# Patient Record
Sex: MALE | Race: WHITE | NOT HISPANIC OR LATINO | Employment: UNEMPLOYED | URBAN - METROPOLITAN AREA
[De-identification: names, ages, dates, MRNs, and addresses within clinical notes are randomized per-mention and may not be internally consistent; named-entity substitution may affect disease eponyms.]

---

## 2023-10-05 PROBLEM — E88.09 PROTEINS SERUM PLASMA LOW: Status: ACTIVE | Noted: 2023-10-05

## 2023-10-05 PROBLEM — E78.1 HYPERTRIGLYCERIDEMIA: Status: ACTIVE | Noted: 2023-10-05

## 2023-10-05 PROBLEM — E78.5 HYPERLIPIDEMIA: Status: ACTIVE | Noted: 2023-10-05

## 2023-10-05 PROBLEM — I10 HTN (HYPERTENSION), BENIGN: Status: ACTIVE | Noted: 2023-10-05

## 2023-10-05 PROBLEM — F33.2 SEVERE EPISODE OF RECURRENT MAJOR DEPRESSIVE DISORDER, WITHOUT PSYCHOTIC FEATURES (MULTI): Status: ACTIVE | Noted: 2023-10-05

## 2023-10-05 PROBLEM — G47.33 OBSTRUCTIVE SLEEP APNEA, ADULT: Status: ACTIVE | Noted: 2023-10-05

## 2023-10-05 PROBLEM — G25.81 RESTLESS LEG SYNDROME: Status: ACTIVE | Noted: 2023-10-05

## 2023-10-05 PROBLEM — M17.12 ARTHRITIS OF KNEE, LEFT: Status: ACTIVE | Noted: 2023-10-05

## 2023-10-05 PROBLEM — G47.9 DIFFICULTY SLEEPING: Status: ACTIVE | Noted: 2023-10-05

## 2023-10-05 PROBLEM — E66.01 MORBID OBESITY (MULTI): Status: ACTIVE | Noted: 2023-10-05

## 2023-10-05 PROBLEM — E11.9 CONTROLLED TYPE 2 DIABETES MELLITUS WITHOUT COMPLICATION, WITHOUT LONG-TERM CURRENT USE OF INSULIN (MULTI): Status: ACTIVE | Noted: 2023-10-05

## 2023-10-05 PROBLEM — M23.307 DEGENERATIVE TEAR OF MENISCUS OF LEFT KNEE: Status: ACTIVE | Noted: 2023-10-05

## 2023-10-05 PROBLEM — R79.89 LOW VITAMIN D LEVEL: Status: ACTIVE | Noted: 2023-10-05

## 2023-10-05 RX ORDER — CHOLECALCIFEROL (VITAMIN D3) 125 MCG
1 CAPSULE ORAL DAILY
COMMUNITY
Start: 2019-09-11

## 2023-10-05 RX ORDER — BLOOD SUGAR DIAGNOSTIC
STRIP MISCELLANEOUS
COMMUNITY
Start: 2021-03-01

## 2023-10-05 RX ORDER — TIZANIDINE 2 MG/1
1-2 TABLET ORAL 3 TIMES DAILY PRN
COMMUNITY
Start: 2021-09-20

## 2023-10-05 RX ORDER — HYDROCHLOROTHIAZIDE 25 MG/1
1 TABLET ORAL DAILY
COMMUNITY
Start: 2021-11-19

## 2023-10-05 RX ORDER — TRAMADOL HYDROCHLORIDE 50 MG/1
1 TABLET ORAL 2 TIMES DAILY PRN
COMMUNITY
Start: 2021-06-09

## 2023-10-05 RX ORDER — SELENIUM SULFIDE 2.5 MG/100ML
1 LOTION TOPICAL
COMMUNITY
Start: 2016-06-01

## 2023-10-05 RX ORDER — GABAPENTIN 300 MG/1
1 CAPSULE ORAL
COMMUNITY
Start: 2022-10-27

## 2023-10-05 RX ORDER — LIRAGLUTIDE 6 MG/ML
INJECTION SUBCUTANEOUS
COMMUNITY
Start: 2022-02-02

## 2023-10-05 RX ORDER — ASPIRIN 81 MG/1
1 TABLET ORAL DAILY
COMMUNITY
Start: 2019-11-08

## 2023-10-05 RX ORDER — METFORMIN HYDROCHLORIDE 500 MG/1
1 TABLET ORAL
COMMUNITY
Start: 2019-09-11

## 2023-10-05 RX ORDER — LISINOPRIL 30 MG/1
1 TABLET ORAL DAILY
COMMUNITY
Start: 2020-09-08

## 2023-10-05 RX ORDER — NALOXONE HYDROCHLORIDE 4 MG/.1ML
SPRAY NASAL
COMMUNITY
Start: 2022-10-27

## 2023-10-05 RX ORDER — ATORVASTATIN CALCIUM 40 MG/1
1 TABLET, FILM COATED ORAL NIGHTLY
COMMUNITY
Start: 2019-10-07

## 2023-10-05 RX ORDER — DICLOFENAC SODIUM 75 MG/1
1 TABLET, DELAYED RELEASE ORAL 2 TIMES DAILY PRN
COMMUNITY
Start: 2021-06-09

## 2023-10-13 ENCOUNTER — TELEMEDICINE CLINICAL SUPPORT (OUTPATIENT)
Dept: UROLOGY | Facility: CLINIC | Age: 58
End: 2023-10-13
Payer: COMMERCIAL

## 2023-10-13 DIAGNOSIS — F33.0 MILD EPISODE OF RECURRENT MAJOR DEPRESSIVE DISORDER (CMS-HCC): Primary | ICD-10-CM

## 2023-10-13 PROCEDURE — 90837 PSYTX W PT 60 MINUTES: CPT | Performed by: PSYCHOLOGIST

## 2023-10-18 NOTE — PROGRESS NOTES
"  Start Time: 1:00  End Time: 1:57    Session format: Telehealth video visit  Session location: Outpatient clinic    Focus of treatment:   Problem List Items Addressed This Visit    None      Session Content  We discussed the following elements from the last session and the homework: Processed ongoing application of homework throughout the week.     The specific focus and goals for this session were on mood management and self acceptance.     We addressed the following therapy components during this session: Chon reports that things are \"ok\" overall.  He expressed that he has become more aware of the need to realize that he is \"not a machine\", such that making changes to his system may not respond mechanically.  Processed through this awareness and examined how making choices towards values and healthy living were often beneficial, allowing the capacity for compassion also needs to be present.  He states, \"I need to keep recognizing that I am a being to be loved.\" Examined self love/self compassion and how to look for elements that build confidence in his ability to navigate difficulties, and the capacity to engage choice, consequence, choice without judging his capacity based on an unforseen or uncontrollable consequence.     Next Appointment: 10/20/2023  "

## 2023-10-20 ENCOUNTER — TELEMEDICINE CLINICAL SUPPORT (OUTPATIENT)
Dept: UROLOGY | Facility: CLINIC | Age: 58
End: 2023-10-20
Payer: COMMERCIAL

## 2023-10-20 DIAGNOSIS — F33.0 MILD EPISODE OF RECURRENT MAJOR DEPRESSIVE DISORDER (CMS-HCC): Primary | ICD-10-CM

## 2023-10-20 PROCEDURE — 90837 PSYTX W PT 60 MINUTES: CPT | Performed by: PSYCHOLOGIST

## 2023-10-23 PROBLEM — F33.0 MILD EPISODE OF RECURRENT MAJOR DEPRESSIVE DISORDER (CMS-HCC): Status: ACTIVE | Noted: 2023-10-23

## 2023-10-25 NOTE — PROGRESS NOTES
"  Start Time: 1:02  End Time: 1:59    Session format: Telehealth video visit  Session location: Outpatient clinic    Focus of treatment:   Problem List Items Addressed This Visit       Mild episode of recurrent major depressive disorder (CMS/HCC) - Primary       Session Content    The specific focus and goals for this session were mood management.     We addressed the following therapy components during this session: Chon reports that sleep has been poor lately, and that he has been having intense headaches.  He acknowledged that when he experiences these kinds of pain experience he notices that it grabs his focus and become problematic. Explored ways to notice this tendency and engage values oriented behavior to ensure he does not get stuck in unhelpful avoidance.  Examined homework of \"messing up\" for growth, and he acknowledge realizing some cooking mistakes and how he was able to meet those with acceptance.  Further processed through avoidance strategies and Chon stated he wanted to get better at \"leaning into quiet\" rather than trying to avoid through \"noise\".  Explored difference between observation of \"presence of pain\" versus \"centralizing the pain\", and how this can change the amount of disruption it may have.      Next Appointment: 10/27/2023  "

## 2023-10-27 ENCOUNTER — TELEMEDICINE CLINICAL SUPPORT (OUTPATIENT)
Dept: UROLOGY | Facility: CLINIC | Age: 58
End: 2023-10-27
Payer: COMMERCIAL

## 2023-10-27 DIAGNOSIS — F33.0 MILD EPISODE OF RECURRENT MAJOR DEPRESSIVE DISORDER (CMS-HCC): Primary | ICD-10-CM

## 2023-10-27 PROCEDURE — 90837 PSYTX W PT 60 MINUTES: CPT | Performed by: PSYCHOLOGIST

## 2023-11-03 ENCOUNTER — TELEMEDICINE CLINICAL SUPPORT (OUTPATIENT)
Dept: UROLOGY | Facility: CLINIC | Age: 58
End: 2023-11-03
Payer: COMMERCIAL

## 2023-11-03 DIAGNOSIS — F33.0 MILD EPISODE OF RECURRENT MAJOR DEPRESSIVE DISORDER (CMS-HCC): Primary | ICD-10-CM

## 2023-11-03 PROCEDURE — 90837 PSYTX W PT 60 MINUTES: CPT | Performed by: PSYCHOLOGIST

## 2023-11-03 NOTE — PROGRESS NOTES
Start Time: 11:02  End Time: 12:00    Session format: Telehealth video visit  Session location: Outpatient clinic    Focus of treatment:   Problem List Items Addressed This Visit       Mild episode of recurrent major depressive disorder (CMS/HCC) - Primary       Session Content    The specific focus and goals for this session were focused on mood management.     We addressed the following therapy components during this session: Chon reported that he has been feeling isolated socially again.  Processed through current challenge of balancing physical health needs with mental health and social needs.  Explored social biases and Chon's assertion that he often finds himself feeling socially distanced from others due to perceived gap in intelligence and without feeling like there is contact with others he becomes more binary in his thinking.  Additionally he expressed that he often does not feel the freedom to try out thoughts with others for fear of social appraisal or the fear of being called out for being wrong.  Explored this anxiety and how it keeps him from feeling connected with others.     Next Appointment: 11/3/2023

## 2023-11-10 ENCOUNTER — TELEMEDICINE CLINICAL SUPPORT (OUTPATIENT)
Dept: UROLOGY | Facility: CLINIC | Age: 58
End: 2023-11-10
Payer: COMMERCIAL

## 2023-11-10 DIAGNOSIS — F33.0 MILD EPISODE OF RECURRENT MAJOR DEPRESSIVE DISORDER (CMS-HCC): Primary | ICD-10-CM

## 2023-11-10 PROCEDURE — 90837 PSYTX W PT 60 MINUTES: CPT | Performed by: PSYCHOLOGIST

## 2023-11-19 NOTE — PROGRESS NOTES
"  Start Time: 1:03  End Time: 2:00    Session format: Telehealth video visit  Session location: Outpatient clinic    Focus of treatment:   Problem List Items Addressed This Visit       Mild episode of recurrent major depressive disorder (CMS/HCC) - Primary       Session Content  The specific focus and goals for this session were towards mood management and engagement of valued interpersonal relationships.     We addressed the following therapy components during this session: Chon reported at the onset of the appointment that he feels like he is \"idling, but hasn't put it into gear\".  Utilized this metaphor to explore readiness for action, and inhibitors to action.  Explored a memory he had regarding a past interaction and his utilization of defusion skills to watch as an observer.  Examined interpersonal vs. Intrapersonal, esoteric vs. exoteric and how this balance of awareness might help him to engage relationships differently and to look at personal responsibility in interactions versus \"fault finding\".        Next Appointment: 11/10/2023  "

## 2023-11-24 ENCOUNTER — APPOINTMENT (OUTPATIENT)
Dept: UROLOGY | Facility: CLINIC | Age: 58
End: 2023-11-24
Payer: COMMERCIAL

## 2023-11-27 ENCOUNTER — TELEMEDICINE CLINICAL SUPPORT (OUTPATIENT)
Dept: UROLOGY | Facility: CLINIC | Age: 58
End: 2023-11-27
Payer: COMMERCIAL

## 2023-11-27 DIAGNOSIS — F33.0 MILD EPISODE OF RECURRENT MAJOR DEPRESSIVE DISORDER (CMS-HCC): Primary | ICD-10-CM

## 2023-11-27 PROCEDURE — 90837 PSYTX W PT 60 MINUTES: CPT | Performed by: PSYCHOLOGIST

## 2023-11-28 NOTE — PROGRESS NOTES
"  Start Time: 1:00  End Time: 1:59    Session format: Telehealth video visit  Session location: Outpatient clinic    Focus of treatment:   Problem List Items Addressed This Visit       Mild episode of recurrent major depressive disorder (CMS/HCC) - Primary       Session Content      The specific focus and goals for this session were mood management and navigation of interpersonal relationships.     We addressed the following therapy components during this session: Chon began by discussing what he has been contemplating since the previous session.  In specific, processed through the idea of social expectancy and how he finds himself being pulled by this in his relationships.  Explored the self as context/self as observer dynamic and his new insights into his self and \"michaelness\" is a dynamic experience.  Examined how the process of interpersonal interactions and knowing of others, can help lead to knowledge of self better.  Identified continued areas of focus over the next weeks.        Next Appointment: 11/27/2023  "

## 2023-12-01 ENCOUNTER — TELEMEDICINE CLINICAL SUPPORT (OUTPATIENT)
Dept: UROLOGY | Facility: CLINIC | Age: 58
End: 2023-12-01
Payer: COMMERCIAL

## 2023-12-01 DIAGNOSIS — F33.0 MILD EPISODE OF RECURRENT MAJOR DEPRESSIVE DISORDER (CMS-HCC): Primary | ICD-10-CM

## 2023-12-01 PROCEDURE — 90837 PSYTX W PT 60 MINUTES: CPT | Performed by: PSYCHOLOGIST

## 2023-12-08 ENCOUNTER — TELEMEDICINE CLINICAL SUPPORT (OUTPATIENT)
Dept: UROLOGY | Facility: CLINIC | Age: 58
End: 2023-12-08
Payer: COMMERCIAL

## 2023-12-08 DIAGNOSIS — F33.0 MILD EPISODE OF RECURRENT MAJOR DEPRESSIVE DISORDER (CMS-HCC): Primary | ICD-10-CM

## 2023-12-08 PROCEDURE — 90837 PSYTX W PT 60 MINUTES: CPT | Performed by: PSYCHOLOGIST

## 2023-12-12 NOTE — PROGRESS NOTES
"  Start Time: 1:00  End Time: 1:58    Session format: Telehealth video visit  Session location: Outpatient clinic    Focus of treatment:   Problem List Items Addressed This Visit       Mild episode of recurrent major depressive disorder (CMS/HCC) - Primary       Session Content      The specific focus and goals for this session were mood management.     We addressed the following therapy components during this session: Chon reports that he has been focusing on his re-engagement of spirituality and termed himself a \"lapsed agnostic\".  Examined how spiritual engagement has assisted in how Chon understands who he is, and how spiritual/Latter-day practice has changed in this current version.  Explored the tie from meditation practice to \"life engagement\" and the core sense of Chon as \"I am\".  Explored the impact this insight has had on his sense of emotional range and a protective factor to depressive periods.       Next Appointment: 12/1/2023  "

## 2023-12-15 ENCOUNTER — TELEMEDICINE CLINICAL SUPPORT (OUTPATIENT)
Dept: UROLOGY | Facility: CLINIC | Age: 58
End: 2023-12-15
Payer: COMMERCIAL

## 2023-12-15 DIAGNOSIS — F33.0 MILD EPISODE OF RECURRENT MAJOR DEPRESSIVE DISORDER (CMS-HCC): Primary | ICD-10-CM

## 2023-12-15 PROCEDURE — 90837 PSYTX W PT 60 MINUTES: CPT | Performed by: PSYCHOLOGIST

## 2023-12-15 NOTE — PROGRESS NOTES
"  Start Time: 1:00  End Time: 1:58    Session format: Telehealth video visit  Session location: Outpatient clinic    Focus of treatment:   Problem List Items Addressed This Visit       Mild episode of recurrent major depressive disorder (CMS/HCC) - Primary       Session Content      The specific focus and goals for this session were mood management and interpersonal relationship functioning.     We addressed the following therapy components during this session: Chon reports continuing to feel that he has made progress.  He states that he is beginning to navigate touching on \"profound sadness\" in his meditation.  Continued to help to frame feeling sadness as human experience in ways that help to prevent depression relapse.  Chon identified feelings of gratitude for the \"gift of time\" and reframing depression as having given him a way to view life from a different perspective and a recognition of his own humanity.  Explored how he has often expected himself to be \"machine like\" rather than human, and how that contributed to black and white binary thinking.  Identified ways to continue to explore this humanity and depth of experience.      Next Appointment: 12/8/2023  "

## 2023-12-22 ENCOUNTER — APPOINTMENT (OUTPATIENT)
Dept: UROLOGY | Facility: CLINIC | Age: 58
End: 2023-12-22
Payer: COMMERCIAL

## 2023-12-29 ENCOUNTER — APPOINTMENT (OUTPATIENT)
Dept: UROLOGY | Facility: CLINIC | Age: 58
End: 2023-12-29
Payer: COMMERCIAL

## 2024-01-05 ENCOUNTER — TELEMEDICINE CLINICAL SUPPORT (OUTPATIENT)
Dept: UROLOGY | Facility: CLINIC | Age: 59
End: 2024-01-05
Payer: COMMERCIAL

## 2024-01-05 DIAGNOSIS — F33.0 MILD EPISODE OF RECURRENT MAJOR DEPRESSIVE DISORDER (CMS-HCC): Primary | ICD-10-CM

## 2024-01-05 PROCEDURE — 90837 PSYTX W PT 60 MINUTES: CPT | Performed by: PSYCHOLOGIST

## 2024-01-11 NOTE — PROGRESS NOTES
"  Start Time: 1:00  End Time: 1:58    Session format: Telehealth video visit  Session location: Outpatient clinic    Focus of treatment:   Problem List Items Addressed This Visit       Mild episode of recurrent major depressive disorder (CMS/HCC) - Primary       Session Content    The specific focus and goals for this session were mood management and relationship functioning.     We addressed the following therapy components during this session: Chon began the session identifying a difficulty that occurred with a \"corporate entity\", and identified ways that he felt successful in his interactions by \"riding out the biochemical\" and allowing himself to stay in control of his responses such that he could investigate fused thoughts and respond with what he could do.  Explored the dichotomy of \"being vs. Doing\", framing this within an understanding of \"false dichotomy\".  Explored a rise in anxiety Chon has been experiencing and contextualized this with observations of other factors such as food and blood sugar.  Investigated maintaining factors that have been keeping him from engaging in social activities and explored possible ways to make behavioral shifts in this area towards his value of interpersonal connection.       Next Appointment: 12/15/2023  "

## 2024-01-12 ENCOUNTER — TELEMEDICINE CLINICAL SUPPORT (OUTPATIENT)
Dept: UROLOGY | Facility: CLINIC | Age: 59
End: 2024-01-12
Payer: COMMERCIAL

## 2024-01-12 DIAGNOSIS — F33.0 MILD EPISODE OF RECURRENT MAJOR DEPRESSIVE DISORDER (CMS-HCC): Primary | ICD-10-CM

## 2024-01-12 PROCEDURE — 90837 PSYTX W PT 60 MINUTES: CPT | Performed by: PSYCHOLOGIST

## 2024-01-18 NOTE — PROGRESS NOTES
"  Start Time: 1:00  End Time: 1:58    Session format: Telehealth video visit  Session location: Outpatient clinic    Focus of treatment:   Problem List Items Addressed This Visit    None      Session Content    The specific focus and goals for this session were mood management and interpersonal relationship building.     We addressed the following therapy components during this session: Chon reports that he has been \"listening for a message\" lately, which has helped him to decentralize perspective.  He further states that he is recognizing how narration of life gives a perspective shift to experience flow of experiences.  Processed through the opportunities for social engagement he has had and explored how this has translated into an ability to watch his response to frustration and how he handles those.  Processed his weekly SMART goals towards values and connection with others and identified possible ways he can engage those.       Next Appointment: 12/22/2023  "

## 2024-01-19 ENCOUNTER — TELEMEDICINE CLINICAL SUPPORT (OUTPATIENT)
Dept: UROLOGY | Facility: CLINIC | Age: 59
End: 2024-01-19
Payer: COMMERCIAL

## 2024-01-19 DIAGNOSIS — F33.0 MILD EPISODE OF RECURRENT MAJOR DEPRESSIVE DISORDER (CMS-HCC): Primary | ICD-10-CM

## 2024-01-19 PROCEDURE — 90837 PSYTX W PT 60 MINUTES: CPT | Performed by: PSYCHOLOGIST

## 2024-01-26 ENCOUNTER — TELEMEDICINE CLINICAL SUPPORT (OUTPATIENT)
Dept: UROLOGY | Facility: CLINIC | Age: 59
End: 2024-01-26
Payer: COMMERCIAL

## 2024-01-26 DIAGNOSIS — F33.0 MILD EPISODE OF RECURRENT MAJOR DEPRESSIVE DISORDER (CMS-HCC): Primary | ICD-10-CM

## 2024-01-26 PROCEDURE — 90837 PSYTX W PT 60 MINUTES: CPT | Performed by: PSYCHOLOGIST

## 2024-01-31 NOTE — PROGRESS NOTES
"  Start Time: 1:00  End Time: 1:58    Session format: Telehealth video visit  Session location: Outpatient clinic    Focus of treatment:   Problem List Items Addressed This Visit       Mild episode of recurrent major depressive disorder (CMS/HCC) - Primary       Session Content    The specific focus and goals for this session were mood management and interpersonal relationship building.     We addressed the following therapy components during this session: Chon reports having some anxiety about his [employer] interview.  Explored the acceptance of \"all I can do is be ready\" in relation to defusion and values focused action.  Processed through current SMART goals focused on engagement of relationships and khurram activities, and explored new versions of \"having friends\" and how he was engaging that new paradigm.     Next Appointment:  "

## 2024-02-02 ENCOUNTER — TELEMEDICINE CLINICAL SUPPORT (OUTPATIENT)
Dept: UROLOGY | Facility: CLINIC | Age: 59
End: 2024-02-02
Payer: COMMERCIAL

## 2024-02-02 DIAGNOSIS — F33.0 MILD EPISODE OF RECURRENT MAJOR DEPRESSIVE DISORDER (CMS-HCC): Primary | ICD-10-CM

## 2024-02-02 PROCEDURE — 90837 PSYTX W PT 60 MINUTES: CPT | Performed by: PSYCHOLOGIST

## 2024-02-06 NOTE — PROGRESS NOTES
"  Start Time: 1:00  End Time: 1:58    Session format: Telehealth video visit  Session location: Outpatient clinic    Focus of treatment:   Problem List Items Addressed This Visit       Mild episode of recurrent major depressive disorder (CMS/HCC) - Primary         Session Content    The specific focus and goals for this session were mood management and interpersonal relationship building.     We addressed the following therapy components during this session:     Chon reports continued engagement of new friendships, including choice to attend a bible study with a new friendship.  Processed through the experience of navigating invitation with his own personal belief and how to engage those two values.  Explored the idea of \"perspective sharing\" vs. \"Debate\" as a framework for function of conversation. Continued to focus on growth in personal identity, connection, and joyful engagement and how those may pose challenge when differences show up in friendship.      Next Appointment:  "

## 2024-02-09 ENCOUNTER — TELEMEDICINE CLINICAL SUPPORT (OUTPATIENT)
Dept: UROLOGY | Facility: CLINIC | Age: 59
End: 2024-02-09
Payer: COMMERCIAL

## 2024-02-09 DIAGNOSIS — F33.0 MILD EPISODE OF RECURRENT MAJOR DEPRESSIVE DISORDER (CMS-HCC): Primary | ICD-10-CM

## 2024-02-09 PROCEDURE — 90837 PSYTX W PT 60 MINUTES: CPT | Performed by: PSYCHOLOGIST

## 2024-02-15 NOTE — PROGRESS NOTES
"  Start Time: 1:00  End Time: 2:00    Session format: Telehealth video visit  Session location: Outpatient clinic    Focus of treatment:   Problem List Items Addressed This Visit       Mild episode of recurrent major depressive disorder (CMS/HCC) - Primary       Session Content    The specific focus and goals for this session were mood management and interpersonal relationship building.     We addressed the following therapy components during this session:     Chon expressed that he has been focusing on gut health, noticing that this seems to play a part in his overall feeling of physical health and mental health as well.  Processed through form vs. Function activities that he was currently engaging in and the setting of achievable goals.  Explored continued exploration of friendship navigation and the balance of time alone vs. Time with people and how this is continuing to allow him to express appreciation for \"opposites\" or difference of experience as neither bad or good.  Explored the utility of schedule and how he can create more structure to assist him in his efforts towards valued activities.   Next Appointment:  "

## 2024-02-16 ENCOUNTER — TELEMEDICINE CLINICAL SUPPORT (OUTPATIENT)
Dept: UROLOGY | Facility: CLINIC | Age: 59
End: 2024-02-16
Payer: COMMERCIAL

## 2024-02-16 DIAGNOSIS — F33.0 MILD EPISODE OF RECURRENT MAJOR DEPRESSIVE DISORDER (CMS-HCC): Primary | ICD-10-CM

## 2024-02-16 PROCEDURE — 90837 PSYTX W PT 60 MINUTES: CPT | Performed by: PSYCHOLOGIST

## 2024-03-01 ENCOUNTER — CLINICAL SUPPORT (OUTPATIENT)
Dept: UROLOGY | Facility: CLINIC | Age: 59
End: 2024-03-01
Payer: COMMERCIAL

## 2024-03-01 DIAGNOSIS — F33.0 MILD EPISODE OF RECURRENT MAJOR DEPRESSIVE DISORDER (CMS-HCC): Primary | ICD-10-CM

## 2024-03-01 PROCEDURE — 90837 PSYTX W PT 60 MINUTES: CPT | Performed by: PSYCHOLOGIST

## 2024-03-05 NOTE — PROGRESS NOTES
Start Time: 1:00  End Time: 2:00    Session format: Telehealth video visit  Session location: Outpatient clinic    Focus of treatment:   Problem List Items Addressed This Visit       Mild episode of recurrent major depressive disorder (CMS/HCC) - Primary         Session Content    The specific focus and goals for this session were mood management and interpersonal relationship building.     We addressed the following therapy components during this session:     Chon reports that he has gone out to a center and did some meditation with a group.  Processed through the difference he found in that experience and his desire to continue to do so.  Explored his current challenge of going through dealing with the continued financial considerations of his mother's estate.  Explored the process of anxiety he goes through and identified areas in which he has choice.  In addition, continued to examine the balance of personal need for connection with others and the need for personal time and space.  Identified ways to help to structure those boundaries.

## 2024-03-08 ENCOUNTER — CLINICAL SUPPORT (OUTPATIENT)
Dept: UROLOGY | Facility: CLINIC | Age: 59
End: 2024-03-08
Payer: COMMERCIAL

## 2024-03-08 DIAGNOSIS — F33.0 MILD EPISODE OF RECURRENT MAJOR DEPRESSIVE DISORDER (CMS-HCC): Primary | ICD-10-CM

## 2024-03-08 PROCEDURE — 90837 PSYTX W PT 60 MINUTES: CPT | Performed by: PSYCHOLOGIST

## 2024-03-12 NOTE — PROGRESS NOTES
"  Start Time: 1:00  End Time: 2:00    Session format: Telehealth video visit  Session location: Outpatient clinic    Focus of treatment:   Problem List Items Addressed This Visit       Mild episode of recurrent major depressive disorder (CMS/HCC) - Primary       Session Content    The specific focus and goals for this session were mood management and interpersonal relationship building.     We addressed the following therapy components during this session:     Chon reported being in pain on this date.  Continued to explore the impact of physical pain on his mental health. Identified ways he feels \"thrown off balance\".  He continues to work through financial inheritance and the stressors of that process.  Explored navigation of communication within friendships and how Chon can help to create clarity in that communication.  Used the concepts of \"language vs. Meaning\" as a backdrop for structuring change in communication patterns.    "

## 2024-03-14 NOTE — PROGRESS NOTES
Start Time: 1:00  End Time: 2:00    Session format: Telehealth video visit  Session location: Outpatient clinic    Focus of treatment:   Problem List Items Addressed This Visit       Mild episode of recurrent major depressive disorder (CMS/HCC) - Primary         Session Content    The specific focus and goals for this session were mood management and interpersonal relationship building.     We addressed the following therapy components during this session:     Chon reported that he is again experiencing heightened physical pain on this date.  Explored his growing awareness that physical pain tends to be one of the most distracting thing he experiences that contributes to his mental health and ability to feel grounded in his routines.  Explored his continued challenges in navigating relationships with friends and how he responds to requests.  Chon identified that without reciprocity he starts to feel depleted and starts to feel some frustration or resentment.  Explored the balance of relationship investment and return that he experiences now and historically.  Helped to frame Chon's difficulty in asking others for things, instead defaulting to helping others with the passive hope his effort will be returned.

## 2024-03-15 ENCOUNTER — CLINICAL SUPPORT (OUTPATIENT)
Dept: UROLOGY | Facility: CLINIC | Age: 59
End: 2024-03-15
Payer: COMMERCIAL

## 2024-03-15 DIAGNOSIS — F33.0 MILD EPISODE OF RECURRENT MAJOR DEPRESSIVE DISORDER (CMS-HCC): Primary | ICD-10-CM

## 2024-03-15 PROCEDURE — 90837 PSYTX W PT 60 MINUTES: CPT | Performed by: PSYCHOLOGIST

## 2024-03-18 NOTE — PROGRESS NOTES
"  Start Time: 1:00  End Time: 1:57    Session format: Telehealth video visit  Session location: Outpatient clinic    Focus of treatment:   Problem List Items Addressed This Visit       Mild episode of recurrent major depressive disorder (CMS/HCC) - Primary       Session Content    The specific focus and goals for this session were mood management and interpersonal relationship building.     We addressed the following therapy components during this session:     Chon stated that he was continuing to work on navigating communication in friendships being developed.  Examined nuances of linguistics and differentiating words from communicated meaning.  Identified areas that stop Chon from his own expression of needs and the tendency he has to focus on him helping the \"wounded bird\".  Chon further reported that he feels that while he has been able to have new perspective in his life, he realizes there are limits to this when it comes to experiences.  That said, he also acknowledged he has noticed that prior suicidal ideation has not been present.    "

## 2024-03-22 ENCOUNTER — CLINICAL SUPPORT (OUTPATIENT)
Dept: UROLOGY | Facility: CLINIC | Age: 59
End: 2024-03-22
Payer: COMMERCIAL

## 2024-03-22 DIAGNOSIS — F33.0 MILD EPISODE OF RECURRENT MAJOR DEPRESSIVE DISORDER (CMS-HCC): Primary | ICD-10-CM

## 2024-03-22 PROCEDURE — 90837 PSYTX W PT 60 MINUTES: CPT | Performed by: PSYCHOLOGIST

## 2024-03-28 NOTE — PROGRESS NOTES
"  Start Time: 1:00  End Time: 1:59    Session format: Telehealth video visit  Session location: Outpatient clinic    Focus of treatment:   Problem List Items Addressed This Visit       Mild episode of recurrent major depressive disorder (CMS/HCC) - Primary         Session Content    The specific focus and goals for this session were mood management and interpersonal relationship building.     We addressed the following therapy components during this session:     Chon reports that over the past two weeks he has been focused on putting energy towards \"getting a groove\".  Explored difficulties in navigating new social relationships and how to balance personal needs and time spent.  Identified ways to place limits on what he can offer and helped to reframe a recognition of parts he plays in the dynamics of a friendship.  Looked at the motivation of being \"kind and accommodating\" and role of \"giver\" as a potential area of need for control.  Explored boundaries using the metaphor of \"permeable, semi-permeable, impermeable\" and coming from a centralized understanding of self needs and limitations.    "

## 2024-03-29 ENCOUNTER — TELEMEDICINE (OUTPATIENT)
Dept: UROLOGY | Facility: CLINIC | Age: 59
End: 2024-03-29
Payer: COMMERCIAL

## 2024-03-29 DIAGNOSIS — F33.0 MILD EPISODE OF RECURRENT MAJOR DEPRESSIVE DISORDER (CMS-HCC): Primary | ICD-10-CM

## 2024-03-29 PROCEDURE — 4010F ACE/ARB THERAPY RXD/TAKEN: CPT | Performed by: PSYCHOLOGIST

## 2024-03-29 PROCEDURE — 90837 PSYTX W PT 60 MINUTES: CPT | Performed by: PSYCHOLOGIST

## 2024-04-05 ENCOUNTER — TELEMEDICINE (OUTPATIENT)
Dept: UROLOGY | Facility: CLINIC | Age: 59
End: 2024-04-05
Payer: COMMERCIAL

## 2024-04-05 ENCOUNTER — APPOINTMENT (OUTPATIENT)
Dept: UROLOGY | Facility: CLINIC | Age: 59
End: 2024-04-05
Payer: COMMERCIAL

## 2024-04-05 DIAGNOSIS — F33.0 MILD EPISODE OF RECURRENT MAJOR DEPRESSIVE DISORDER (CMS-HCC): Primary | ICD-10-CM

## 2024-04-05 PROCEDURE — 90837 PSYTX W PT 60 MINUTES: CPT | Performed by: PSYCHOLOGIST

## 2024-04-05 PROCEDURE — 4010F ACE/ARB THERAPY RXD/TAKEN: CPT | Performed by: PSYCHOLOGIST

## 2024-04-11 NOTE — PROGRESS NOTES
"  Start Time: 1:00  End Time: 1:59    Session format: Telehealth video visit  Session location: Outpatient clinic    Focus of treatment:   Problem List Items Addressed This Visit       Mild episode of recurrent major depressive disorder (CMS/HCC) - Primary       Session Content    The specific focus and goals for this session were mood management and interpersonal relationship building.     We addressed the following therapy components during this session:     Chon reports that he ended up trying the \"new way of relating\" to others discussed in prior session while at the food pantry.  He felt some success in doing so without putting him in the role of being a \"rescuer\".   Additionally, he felt good about handling a situation with his sister regarding cooking for a friend.  Examined some feelings of frustration and being \"derailed\" and his ability to treat these with more ladarius and acceptance.  Explored management of emotional resources and the idea of operating within his capacity and to prioritize valued activities.   "

## 2024-04-12 ENCOUNTER — TELEMEDICINE (OUTPATIENT)
Dept: UROLOGY | Facility: CLINIC | Age: 59
End: 2024-04-12
Payer: COMMERCIAL

## 2024-04-12 DIAGNOSIS — F33.0 MILD EPISODE OF RECURRENT MAJOR DEPRESSIVE DISORDER (CMS-HCC): Primary | ICD-10-CM

## 2024-04-12 PROCEDURE — 90837 PSYTX W PT 60 MINUTES: CPT | Performed by: PSYCHOLOGIST

## 2024-04-12 PROCEDURE — 4010F ACE/ARB THERAPY RXD/TAKEN: CPT | Performed by: PSYCHOLOGIST

## 2024-04-19 ENCOUNTER — APPOINTMENT (OUTPATIENT)
Dept: UROLOGY | Facility: CLINIC | Age: 59
End: 2024-04-19
Payer: COMMERCIAL

## 2024-04-19 DIAGNOSIS — F33.0 MILD EPISODE OF RECURRENT MAJOR DEPRESSIVE DISORDER (CMS-HCC): Primary | ICD-10-CM

## 2024-04-25 NOTE — PROGRESS NOTES
"  Start Time: 1:00  End Time: 1:58    Session format: Telehealth video visit  Session location: Outpatient clinic    Focus of treatment:   Problem List Items Addressed This Visit       Mild episode of recurrent major depressive disorder (CMS-HCC) - Primary       Session Content    The specific focus and goals for this session were mood management and interpersonal relationship building.     We addressed the following therapy components during this session:     Chon reports that he has gotten to a a place where he is feeling tired out in his new relationships and ended up \"ghosting\".  Explored how this was his version of controlling the situation and feeling \"trapped\" by the expectations and needs of others.  Framed his current feelings as \"low power mode\" and explored concept of capacity and how to be responsible for interactions with others, but not having to always do for others at his own expense.    "

## 2024-04-26 ENCOUNTER — APPOINTMENT (OUTPATIENT)
Dept: UROLOGY | Facility: CLINIC | Age: 59
End: 2024-04-26
Payer: COMMERCIAL

## 2024-04-26 DIAGNOSIS — F33.0 MILD EPISODE OF RECURRENT MAJOR DEPRESSIVE DISORDER (CMS-HCC): Primary | ICD-10-CM

## 2024-04-26 PROCEDURE — 4010F ACE/ARB THERAPY RXD/TAKEN: CPT | Performed by: PSYCHOLOGIST

## 2024-04-26 PROCEDURE — 90837 PSYTX W PT 60 MINUTES: CPT | Performed by: PSYCHOLOGIST

## 2024-05-02 NOTE — PROGRESS NOTES
"  Start Time: 1:00  End Time: 1:58    Session format: Telehealth video visit  Session location: Outpatient clinic    Focus of treatment:   Problem List Items Addressed This Visit       Mild episode of recurrent major depressive disorder (CMS-HCC) - Primary         Session Content    The specific focus and goals for this session were mood management and interpersonal relationship building.     We addressed the following therapy components during this session:     Chon reported that he has been working with internalized thoughts and asking the question \"Can I really like my life the way I want it, as long as I'm willing to accept the consequences\".  Explored how this affects his decision making as well as his own feeling of being settled in his own life and emotional experience of choice.  Explored beliefs that become \"self limiting\" and assisted in reassessing values that drive those choices.    "

## 2024-05-03 ENCOUNTER — APPOINTMENT (OUTPATIENT)
Dept: UROLOGY | Facility: CLINIC | Age: 59
End: 2024-05-03
Payer: COMMERCIAL

## 2024-05-03 DIAGNOSIS — F33.0 MILD EPISODE OF RECURRENT MAJOR DEPRESSIVE DISORDER (CMS-HCC): Primary | ICD-10-CM

## 2024-05-03 PROCEDURE — 90837 PSYTX W PT 60 MINUTES: CPT | Performed by: PSYCHOLOGIST

## 2024-05-03 PROCEDURE — 4010F ACE/ARB THERAPY RXD/TAKEN: CPT | Performed by: PSYCHOLOGIST

## 2024-05-10 ENCOUNTER — APPOINTMENT (OUTPATIENT)
Dept: UROLOGY | Facility: CLINIC | Age: 59
End: 2024-05-10
Payer: COMMERCIAL

## 2024-05-10 DIAGNOSIS — F33.0 MILD EPISODE OF RECURRENT MAJOR DEPRESSIVE DISORDER (CMS-HCC): Primary | ICD-10-CM

## 2024-05-10 PROCEDURE — 4010F ACE/ARB THERAPY RXD/TAKEN: CPT | Performed by: PSYCHOLOGIST

## 2024-05-10 PROCEDURE — 90837 PSYTX W PT 60 MINUTES: CPT | Performed by: PSYCHOLOGIST

## 2024-05-15 NOTE — PROGRESS NOTES
"  Start Time: 1:00  End Time: 1:58    Session format: Telehealth video visit  Session location: Outpatient clinic    Focus of treatment:   Problem List Items Addressed This Visit       Mild episode of recurrent major depressive disorder (CMS-HCC) - Primary       Session Content    The specific focus and goals for this session were mood management and interpersonal relationship building.     We addressed the following therapy components during this session:     Chon reports that he had his daughter over to visit, and feels good about how he is currently choosing to spend his energy.  Explored areas in which he feels he may still have \"disproportionate responses to stimuli\" and processed through potential ways that has functioned in his life, and how to create continued movement towards the person he he wants to be.  Explored intentionality of choice, couched in the framework that throughout his life he has always done the best he could at the time.    "

## 2024-05-17 ENCOUNTER — APPOINTMENT (OUTPATIENT)
Dept: UROLOGY | Facility: CLINIC | Age: 59
End: 2024-05-17
Payer: COMMERCIAL

## 2024-05-17 DIAGNOSIS — F33.0 MILD EPISODE OF RECURRENT MAJOR DEPRESSIVE DISORDER (CMS-HCC): Primary | ICD-10-CM

## 2024-05-17 PROCEDURE — 90837 PSYTX W PT 60 MINUTES: CPT | Performed by: PSYCHOLOGIST

## 2024-05-17 PROCEDURE — 4010F ACE/ARB THERAPY RXD/TAKEN: CPT | Performed by: PSYCHOLOGIST

## 2024-05-23 NOTE — PROGRESS NOTES
"  Start Time: 1:00  End Time: 1:58    Session format: Telehealth video visit  Session location: Outpatient clinic    Focus of treatment:   Problem List Items Addressed This Visit       Mild episode of recurrent major depressive disorder (CMS-HCC) - Primary         Session Content    The specific focus and goals for this session were mood management and interpersonal relationship building.     We addressed the following therapy components during this session:     Chon reports that he has noticed that his eating has been less in control as of late, which has affected his blood sugars.  Explored how current levels of anxiety regarding tax information and other financial concerns impact his physical health and controls around behavior like eating.  Identified some ways he gets stuck cognitively on \"false timelines\" that impact frustration and patience.  On a positive note, he reports that he was discussing the need for interpersonal boundaries with his daughter, and heard himself telling her things that he has been working on in his own life.  Examined how his awareness can increase his commitment to setting good interpersonal boundaries with others for himself.   "

## 2024-05-24 ENCOUNTER — APPOINTMENT (OUTPATIENT)
Dept: UROLOGY | Facility: CLINIC | Age: 59
End: 2024-05-24
Payer: COMMERCIAL

## 2024-05-24 DIAGNOSIS — F33.0 MILD EPISODE OF RECURRENT MAJOR DEPRESSIVE DISORDER (CMS-HCC): Primary | ICD-10-CM

## 2024-05-24 PROCEDURE — 90837 PSYTX W PT 60 MINUTES: CPT | Performed by: PSYCHOLOGIST

## 2024-05-24 PROCEDURE — 4010F ACE/ARB THERAPY RXD/TAKEN: CPT | Performed by: PSYCHOLOGIST

## 2024-05-31 ENCOUNTER — APPOINTMENT (OUTPATIENT)
Dept: UROLOGY | Facility: CLINIC | Age: 59
End: 2024-05-31
Payer: COMMERCIAL

## 2024-05-31 DIAGNOSIS — F33.0 MILD EPISODE OF RECURRENT MAJOR DEPRESSIVE DISORDER (CMS-HCC): Primary | ICD-10-CM

## 2024-05-31 PROCEDURE — 4010F ACE/ARB THERAPY RXD/TAKEN: CPT | Performed by: PSYCHOLOGIST

## 2024-05-31 PROCEDURE — 90837 PSYTX W PT 60 MINUTES: CPT | Performed by: PSYCHOLOGIST

## 2024-06-06 NOTE — PROGRESS NOTES
"  Start Time: 1:00  End Time: 1:57    Session format: Telehealth video visit  Session location: Outpatient clinic    Focus of treatment:   Problem List Items Addressed This Visit       Mild episode of recurrent major depressive disorder (CMS-HCC) - Primary       Session Content    The specific focus and goals for this session were mood management and interpersonal relationship building.     We addressed the following therapy components during this session:     Chon reports that his eating has improved since the last appointment.  Additionally, he was able to notice how he was giving advise to his daughter, and heard the message as a message to himself as well.  Processed through this insight, and framed this as evidence of change in his ability to be psychologically flexible.  Explored the way that day to day issues seem to have a presentation/resolution/presentation cycle and how he often seeks out new things to be upset about.  Framed this as a human tendency and looked to understand the process without it becoming an anxious focal point.  Examined \"prescriptive vs. Descriptive analytics\" and how that terms of \"normal\" and \"abnormal\" may not equal \"good\" and \"bad\".  Identified areas of focus for the next week to integrate insights into action steps.    "

## 2024-06-07 ENCOUNTER — APPOINTMENT (OUTPATIENT)
Dept: UROLOGY | Facility: CLINIC | Age: 59
End: 2024-06-07
Payer: COMMERCIAL

## 2024-06-07 DIAGNOSIS — F33.0 MILD EPISODE OF RECURRENT MAJOR DEPRESSIVE DISORDER (CMS-HCC): Primary | ICD-10-CM

## 2024-06-07 PROCEDURE — 90837 PSYTX W PT 60 MINUTES: CPT | Performed by: PSYCHOLOGIST

## 2024-06-07 PROCEDURE — 4010F ACE/ARB THERAPY RXD/TAKEN: CPT | Performed by: PSYCHOLOGIST

## 2024-06-15 NOTE — PROGRESS NOTES
"  Start Time: 1:00  End Time: 1:58    Session format: Telehealth video visit  Session location: Outpatient clinic    Focus of treatment:   Problem List Items Addressed This Visit       Mild episode of recurrent major depressive disorder (CMS-HCC) - Primary         Session Content    The specific focus and goals for this session were mood management and interpersonal relationship building.     We addressed the following therapy components during this session:     Chon reports that he is getting together with his sister for a movie with just the two of them.  Processed the shift in being able to do things with her that do not have \"other motives\" from her.  He has been experiencing some tension with his roommate while the roommate recovers from surgery.  Explored his experience of frustration with HUD evaluation and \"proportionate anger\" for his frustrations. Sleep is still difficult for him.  Explored his changes over the course of therapy thus far and his recognition that he is not the \"same person\" as he once was, and contextualized his past experiences and the limits of his capacity and having felt depleted in his life.  Explored ways to continue to monitor his expenditure of energy towards values based activities.     "

## 2024-06-17 ENCOUNTER — APPOINTMENT (OUTPATIENT)
Dept: UROLOGY | Facility: CLINIC | Age: 59
End: 2024-06-17
Payer: COMMERCIAL

## 2024-06-17 DIAGNOSIS — F33.0 MILD EPISODE OF RECURRENT MAJOR DEPRESSIVE DISORDER (CMS-HCC): Primary | ICD-10-CM

## 2024-06-17 PROCEDURE — 4010F ACE/ARB THERAPY RXD/TAKEN: CPT | Performed by: PSYCHOLOGIST

## 2024-06-17 PROCEDURE — 90837 PSYTX W PT 60 MINUTES: CPT | Performed by: PSYCHOLOGIST

## 2024-06-20 NOTE — PROGRESS NOTES
"  Start Time: 1:00  End Time: 1:59    Session format: Telehealth video visit  Session location: Outpatient clinic    Focus of treatment:   Problem List Items Addressed This Visit       Mild episode of recurrent major depressive disorder (CMS-HCC) - Primary       Session Content    The specific focus and goals for this session were mood management and interpersonal relationship building.     We addressed the following therapy components during this session:     Chon started the session saying he feels \"something is going on, but not sure what it is.\"  Sleep has reportedly been continuously off, eating is off, blood sugar off, social is down.  Processed through his ability to recognize feeling \"off\" in a way that did not go into panic, but rather investigation.  Explored the balance of life activities and the way he has been managing those.  In particular, investigated Chon's internal pull to overextend his helping of others with the though \"I should do, because I can do\", versus looking at the balance of capacity and putting his own needs on equal playing field as others.  Identified some ways he can engage with those needs in the coming week.   "

## 2024-06-21 ENCOUNTER — APPOINTMENT (OUTPATIENT)
Dept: UROLOGY | Facility: CLINIC | Age: 59
End: 2024-06-21
Payer: COMMERCIAL

## 2024-06-21 DIAGNOSIS — F33.0 MILD EPISODE OF RECURRENT MAJOR DEPRESSIVE DISORDER (CMS-HCC): Primary | ICD-10-CM

## 2024-06-21 PROCEDURE — 90837 PSYTX W PT 60 MINUTES: CPT | Performed by: PSYCHOLOGIST

## 2024-06-21 PROCEDURE — 4010F ACE/ARB THERAPY RXD/TAKEN: CPT | Performed by: PSYCHOLOGIST

## 2024-06-26 NOTE — PROGRESS NOTES
Start Time: 1:00  End Time: 1:58    Session format: Telehealth video visit  Session location: Outpatient clinic    Focus of treatment:   Problem List Items Addressed This Visit       Mild episode of recurrent major depressive disorder (CMS-HCC) - Primary       Session Content    The specific focus and goals for this session were mood management and interpersonal relationship building.     We addressed the following therapy components during this session:     Chon reports that he has ordered c-pap supplies to address his continued difficulties with sleep.  He has been reportedly thinking about his spiritual life.  Explored his history of spiritual upbringing and how that has impacted his world view and how he has previously approached his spirituality. Explored the intersections of life experience that created the uniqueness of who he is as a person.  Engaged in some exploration of cognitive defusion and how languaging our life can shape our view of experiences and how to allow for flexibility.

## 2024-06-28 ENCOUNTER — APPOINTMENT (OUTPATIENT)
Dept: UROLOGY | Facility: CLINIC | Age: 59
End: 2024-06-28
Payer: COMMERCIAL

## 2024-06-28 DIAGNOSIS — F33.0 MILD EPISODE OF RECURRENT MAJOR DEPRESSIVE DISORDER (CMS-HCC): Primary | ICD-10-CM

## 2024-06-28 PROCEDURE — 90837 PSYTX W PT 60 MINUTES: CPT | Performed by: PSYCHOLOGIST

## 2024-06-28 PROCEDURE — 4010F ACE/ARB THERAPY RXD/TAKEN: CPT | Performed by: PSYCHOLOGIST

## 2024-07-02 NOTE — PROGRESS NOTES
"  Start Time: 1:00  End Time: 1:58    Session format: Telehealth video visit  Session location: Outpatient clinic    Focus of treatment:   Problem List Items Addressed This Visit       Mild episode of recurrent major depressive disorder (CMS-HCC) - Primary       Session Content    The specific focus and goals for this session were mood management and interpersonal relationship building.     We addressed the following therapy components during this session:     Chon reports that he has continued to have a lot of issues with sleep and currently feels sleep deprived.  He did get his mask for the cpap machine.  Explored his continued engagement of relationships and an interaction that irritated him and what he is understanding about his past and expectations of others.  In particular, processed through an internalized message of \"If you had self respect... you should have done...\"  Developed insight into his need for what offering self respect means and how he can centralize where his control is.  Explored ways he can continue to shift towards healthy boundaries and mental health without the critical overlay.   "

## 2024-07-05 ENCOUNTER — APPOINTMENT (OUTPATIENT)
Dept: UROLOGY | Facility: CLINIC | Age: 59
End: 2024-07-05
Payer: COMMERCIAL

## 2024-07-09 NOTE — PROGRESS NOTES
Start Time: 1:00  End Time: 1:58    Session format: Telehealth video visit  Session location: Outpatient clinic    Focus of treatment:   Problem List Items Addressed This Visit       Mild episode of recurrent major depressive disorder (CMS-HCC) - Primary     Session Content    The specific focus and goals for this session were mood management and interpersonal relationship building.     We addressed the following therapy components during this session:     Chon reports that he had his neurology appointment and got an increase in his medication.  Sleep continues to remain a challenge.  Explored the continued challenge of having mental health impacted by ongoing physical health challenges.  Explored ranges of his emotional capacity and how far he is willing to overextend himself with people.  He was able to identity a recent incident in which he set a personal boundary and did not go into a spiral of self criticism.  Explored levels of sharing within friendships and other relationships and framed those levels in a context dependent and flexible way.

## 2024-07-11 NOTE — PROGRESS NOTES
"  Start Time: 1:00  End Time: 1:58    Session format: Telehealth video visit  Session location: Outpatient clinic    Focus of treatment:   Problem List Items Addressed This Visit       Mild episode of recurrent major depressive disorder (CMS-HCC) - Primary       Session Content    The specific focus and goals for this session were mood management and interpersonal relationship building.     We addressed the following therapy components during this session:     Chon reports that while sleep has been an issue, he has been able to get a bit more sleep and feels \"less psychotic\".  Examined how importance of sleep is for many other areas of Chon's health, physically and mentally.  He was able to give examples where he was able to contextualize people's behavior in a way that did not activate old scripts as strongly, allowing him to respond more aligned with how he would like to be.  Identified core shifts in thinking of giving himself \"permission to fail, permission to grown, permission to try.\" Which has allowed him to engage in actions that he may not have otherwise.  Explored how this was helpful to him on a mental health side of life.    "

## 2024-07-12 ENCOUNTER — APPOINTMENT (OUTPATIENT)
Dept: UROLOGY | Facility: CLINIC | Age: 59
End: 2024-07-12
Payer: COMMERCIAL

## 2024-07-12 DIAGNOSIS — F33.0 MILD EPISODE OF RECURRENT MAJOR DEPRESSIVE DISORDER (CMS-HCC): Primary | ICD-10-CM

## 2024-07-12 PROCEDURE — 4010F ACE/ARB THERAPY RXD/TAKEN: CPT | Performed by: PSYCHOLOGIST

## 2024-07-12 PROCEDURE — 90837 PSYTX W PT 60 MINUTES: CPT | Performed by: PSYCHOLOGIST

## 2024-07-18 NOTE — PROGRESS NOTES
"  Start Time: 1:00  End Time: 1:58    Session format: Telehealth video visit  Session location: Outpatient clinic    Focus of treatment:   Problem List Items Addressed This Visit       Mild episode of recurrent major depressive disorder (CMS-HCC) - Primary     Session Content    The specific focus and goals for this session were mood management and interpersonal relationship building.     We addressed the following therapy components during this session:     Chon reports that sleep has improved some.  Examined current challenges in relationships and connected those with the vulnerability balance he has regarding his internal need for connection and the feelings about others taking advantage of his kindness and vulnerability.  Explored the overlapping of relationships and levels of closeness.  Explored \"friendship fidelity\" and what that means versus what is assumed between people.    "

## 2024-07-19 ENCOUNTER — APPOINTMENT (OUTPATIENT)
Dept: UROLOGY | Facility: CLINIC | Age: 59
End: 2024-07-19
Payer: COMMERCIAL

## 2024-07-19 DIAGNOSIS — F33.0 MILD EPISODE OF RECURRENT MAJOR DEPRESSIVE DISORDER (CMS-HCC): Primary | ICD-10-CM

## 2024-07-19 PROCEDURE — 90837 PSYTX W PT 60 MINUTES: CPT | Performed by: PSYCHOLOGIST

## 2024-07-19 PROCEDURE — 4010F ACE/ARB THERAPY RXD/TAKEN: CPT | Performed by: PSYCHOLOGIST

## 2024-07-24 NOTE — PROGRESS NOTES
"  Start Time: 1:00  End Time: 1:58    Session format: Telehealth video visit  Session location: Outpatient clinic    Focus of treatment:   Problem List Items Addressed This Visit       Mild episode of recurrent major depressive disorder (CMS-HCC) - Primary       Session Content    The specific focus and goals for this session were mood management and interpersonal relationship building.     We addressed the following therapy components during this session:     Chon states that he has continued to work through the struggle of wanting connection to others and having some \"disdain for humanity\".  Explored his seeking to find a balance between those two things, and the personal needs he has along with those of others.  Explored his belief in community and how those needs of community have been exemplified for himself, and how intent and impact often do not match.  Explored ways he can defuse from the binary of needing to do \"all\" or \"nothing\" when it comes to self and other needs in relationships.   "

## 2024-07-26 ENCOUNTER — APPOINTMENT (OUTPATIENT)
Dept: UROLOGY | Facility: CLINIC | Age: 59
End: 2024-07-26
Payer: COMMERCIAL

## 2024-07-26 DIAGNOSIS — F33.0 MILD EPISODE OF RECURRENT MAJOR DEPRESSIVE DISORDER (CMS-HCC): Primary | ICD-10-CM

## 2024-07-26 PROCEDURE — 90837 PSYTX W PT 60 MINUTES: CPT | Performed by: PSYCHOLOGIST

## 2024-07-26 PROCEDURE — 4010F ACE/ARB THERAPY RXD/TAKEN: CPT | Performed by: PSYCHOLOGIST

## 2024-07-26 NOTE — PROGRESS NOTES
"  Start Time: 1:00  End Time: 1:59    Session format: Telehealth video visit  Session location: Outpatient clinic    Focus of treatment:   Problem List Items Addressed This Visit       Mild episode of recurrent major depressive disorder (CMS-HCC) - Primary       Session Content    The specific focus and goals for this session were mood management and interpersonal relationship building.     We addressed the following therapy components during this session:     Chon reports that he had his children over for Father's Day.  He was able to navigate the balance of him cooking and hosting with visiting his kids and while tired afterwards felt that it was worth it to have that valued time with his kids.  Processed through his insight that he has noticed that most things deemed a mistake in his life have been internalized as \"you fucked up\", no matter the degree.  Revisited \"dead person/live person\" goals and reframing \"mistakes\" as growth point to defuse the function they have had on his depressive stance and towards committed action towards values.    "

## 2024-07-30 NOTE — PROGRESS NOTES
"  Start Time: 1:00  End Time: 1:58    Session format: Telehealth video visit  Session location: Outpatient clinic    Focus of treatment:   Problem List Items Addressed This Visit       Mild episode of recurrent major depressive disorder (CMS-HCC) - Primary       Session Content    The specific focus and goals for this session were mood management and interpersonal relationship building.     We addressed the following therapy components during this session:     Chon reports that he is excited to be going to see his daughter and her fiance during the 4th holiday.  Reflected on and processed on the anniversaries of his brother and mother's passing and the impact that those deaths has had on him.  Explored the impact of that stress on his depressive episode that followed.  Chon reported putting forth more effort on availability of connection with his sister and has come to realize that connection does not have to be a \"big meaningful event\".  Identified how this can help to balance the capacity challenges he has faced regarding personal time and time with and for others.    "

## 2024-08-01 NOTE — PROGRESS NOTES
Start Time: 1:00  End Time: 1:58    Session format: Telehealth video visit  Session location: Outpatient clinic    Focus of treatment:   Problem List Items Addressed This Visit       Mild episode of recurrent major depressive disorder (CMS-HCC) - Primary     Session Content    The specific focus and goals for this session were mood management and interpersonal relationship building.     We addressed the following therapy components during this session:     Chon reports that he continues to navigate balancing the needs of others with his own personal needs and healthy boundary setting.  Explored current goals of working on managing limits of control and navigating how to engage within those limits.  Processed his internal sense of valued behavior and identified ways to apply this in his current relationships.

## 2024-08-01 NOTE — PROGRESS NOTES
"  Start Time: 1:00  End Time: 1:58    Session format: Telehealth video visit  Session location: Outpatient clinic    Focus of treatment:   Problem List Items Addressed This Visit       Mild episode of recurrent major depressive disorder (CMS-HCC) - Primary     Session Content    The specific focus and goals for this session were mood management and interpersonal relationship building.     We addressed the following therapy components during this session:     Chon reports that he recently has been having intrusive thoughts of \"past mistakes\".  He was able to draw some insight into noticing it was often a component of someone witnessing a mess up.  Processed through aspects of why social appraisal triggers shame and how to take chances to reframed those situations towards growth.   "

## 2024-08-01 NOTE — PROGRESS NOTES
Start Time: 1:00  End Time: 1:58    Session format: Telehealth video visit  Session location: Outpatient clinic    Focus of treatment:   Problem List Items Addressed This Visit       Mild episode of recurrent major depressive disorder (CMS-HCC) - Primary       Session Content    The specific focus and goals for this session were mood management and interpersonal relationship building.     We addressed the following therapy components during this session:     Chon reports that enjoyed his trip to [place] to visit his daughter.  Processed the importance of that value for him.  He is still working to get an ENT appointment for sleep issues.  He spent time planning his daughter's wedding and expressed feeling good about the values oriented work it will bring.  Processed through situations with social contacts and his ability to provide helpful connections without overextending.

## 2024-08-02 ENCOUNTER — APPOINTMENT (OUTPATIENT)
Dept: UROLOGY | Facility: CLINIC | Age: 59
End: 2024-08-02
Payer: COMMERCIAL

## 2024-08-02 DIAGNOSIS — F33.0 MILD EPISODE OF RECURRENT MAJOR DEPRESSIVE DISORDER (CMS-HCC): Primary | ICD-10-CM

## 2024-08-02 PROCEDURE — 90837 PSYTX W PT 60 MINUTES: CPT | Performed by: PSYCHOLOGIST

## 2024-08-02 PROCEDURE — 4010F ACE/ARB THERAPY RXD/TAKEN: CPT | Performed by: PSYCHOLOGIST

## 2024-08-05 NOTE — PROGRESS NOTES
"  Start Time: 1:00  End Time: 1:58    Session format: Telehealth video visit  Session location: Outpatient clinic    Focus of treatment:   Problem List Items Addressed This Visit       Mild episode of recurrent major depressive disorder (CMS-HCC) - Primary       Session Content    The specific focus and goals for this session were mood management and interpersonal relationship building.     We addressed the following therapy components during this session:     Chon reports he has been \"pulling the thread\" of the prior week's focus.  He gained some insight into the fact that he is coming against core issues of \"being right\", and \"being seen as good\".  He shared a poem he wrote about his sense of being.  Processed through the themes and imagery and how those related to his sense of being.  Explored moral underpinings and learning history that keeps those beliefs engrained in his struggle.  Utilized a snake shed metaphor to help frame growth and feeling tightness in previously learned limits.    "

## 2024-08-08 NOTE — PROGRESS NOTES
"  Start Time: 1:00  End Time: 1:58    Session format: Telehealth video visit  Session location: Outpatient clinic    Focus of treatment:   Problem List Items Addressed This Visit       Mild episode of recurrent major depressive disorder (CMS-HCC) - Primary       Session Content    The specific focus and goals for this session were mood management and interpersonal relationship building.     We addressed the following therapy components during this session:     Chon reports that he finally met with an ENT and found that there are no concerns, so will continue to investigate the sleep challenges with his neurologist.  He has been reading on metabolic disease and applying changes to his food management of diet and sugar to continue to help manage his diabetes.  He reports that he was told he is one of the \"best managed patients\", which reportedly helped him to re-frame the times he has felt he struggles with blood sugar fluctuations.  Alejandro parallels to social management and how he regulates his social engagements.  He continues to struggle with a sense of failure and is working to modify the parameters of what this means for him.  Identified some positive reframing of his awareness and explored some ways to defuse the thoughts so they are less impactful on his overall sense of self.    "

## 2024-08-09 ENCOUNTER — APPOINTMENT (OUTPATIENT)
Dept: UROLOGY | Facility: CLINIC | Age: 59
End: 2024-08-09
Payer: COMMERCIAL

## 2024-08-09 DIAGNOSIS — F33.0 MILD EPISODE OF RECURRENT MAJOR DEPRESSIVE DISORDER (CMS-HCC): Primary | ICD-10-CM

## 2024-08-09 PROCEDURE — 90837 PSYTX W PT 60 MINUTES: CPT | Performed by: PSYCHOLOGIST

## 2024-08-09 PROCEDURE — 4010F ACE/ARB THERAPY RXD/TAKEN: CPT | Performed by: PSYCHOLOGIST

## 2024-08-16 ENCOUNTER — APPOINTMENT (OUTPATIENT)
Dept: UROLOGY | Facility: CLINIC | Age: 59
End: 2024-08-16
Payer: COMMERCIAL

## 2024-08-16 NOTE — PROGRESS NOTES
Start Time: 1:00  End Time: 1:58    Session format: Telehealth video visit  Session location: Outpatient clinic    Focus of treatment:   Problem List Items Addressed This Visit       Mild episode of recurrent major depressive disorder (CMS-HCC) - Primary       Session Content    The specific focus and goals for this session were mood management and interpersonal relationship building.     We addressed the following therapy components during this session:     Chon reports that he has been continuing to focus on dietary management of his diabetes, and is feeling good about his efforts.  Explored the overlap of physical health and mental health and his overall health management.  Explored a recent/current situation of needing to ask for follow up on some things with his brother in law, and the anxiety it has for him in worry of imposing on others.  Helped to reframe his efforts and identify communication patterns that would assist in meeting the need, while also attending to the relationship components that he finds important.

## 2024-08-23 ENCOUNTER — APPOINTMENT (OUTPATIENT)
Dept: UROLOGY | Facility: CLINIC | Age: 59
End: 2024-08-23
Payer: COMMERCIAL

## 2024-08-23 DIAGNOSIS — F33.0 MILD EPISODE OF RECURRENT MAJOR DEPRESSIVE DISORDER (CMS-HCC): Primary | ICD-10-CM

## 2024-08-23 PROCEDURE — 90837 PSYTX W PT 60 MINUTES: CPT | Performed by: PSYCHOLOGIST

## 2024-08-23 PROCEDURE — 4010F ACE/ARB THERAPY RXD/TAKEN: CPT | Performed by: PSYCHOLOGIST

## 2024-08-30 ENCOUNTER — APPOINTMENT (OUTPATIENT)
Dept: UROLOGY | Facility: CLINIC | Age: 59
End: 2024-08-30
Payer: COMMERCIAL

## 2024-08-30 DIAGNOSIS — F33.0 MILD EPISODE OF RECURRENT MAJOR DEPRESSIVE DISORDER (CMS-HCC): Primary | ICD-10-CM

## 2024-08-30 NOTE — PROGRESS NOTES
"  Start Time: 1:00  End Time: 1:58    Session format: Telehealth video visit  Session location: Outpatient clinic    Focus of treatment:   Problem List Items Addressed This Visit       Mild episode of recurrent major depressive disorder (CMS-HCC) - Primary       Session Content    The specific focus and goals for this session were mood management and interpersonal relationship building.     We addressed the following therapy components during this session:     Chon reports that a lot has happened over the past couple of weeks.  Positive movement was reported in regard to the way he has managed dietary choices.  He acknowledged feeling improvement in his mental health that coincides with his dietary health as well.  Explored the internal thought he has that \"If I don't stay on top of things, life will run me over.\" Framed this as a \"mindset of hypervigilence\" and helped to process the impact that mindset has on his day to day life.  Helped to create some defusion from this thought process in an effort to gain some flexibility in choice to reduce the energy expenditure on hypervigilent action and more on vitalizing activities.   "

## 2024-09-06 ENCOUNTER — APPOINTMENT (OUTPATIENT)
Dept: UROLOGY | Facility: CLINIC | Age: 59
End: 2024-09-06
Payer: COMMERCIAL

## 2024-09-06 DIAGNOSIS — F33.0 MILD EPISODE OF RECURRENT MAJOR DEPRESSIVE DISORDER (CMS-HCC): Primary | ICD-10-CM

## 2024-09-06 PROCEDURE — 90837 PSYTX W PT 60 MINUTES: CPT | Performed by: PSYCHOLOGIST

## 2024-09-06 PROCEDURE — 4010F ACE/ARB THERAPY RXD/TAKEN: CPT | Performed by: PSYCHOLOGIST

## 2024-09-07 NOTE — PROGRESS NOTES
Start Time: 1:00  End Time: 1:58    Session format: Telehealth video visit  Session location: Outpatient clinic    Focus of treatment:   Problem List Items Addressed This Visit       Mild episode of recurrent major depressive disorder (CMS-HCC) - Primary     Session Content    The specific focus and goals for this session were mood management and interpersonal relationship building.     We addressed the following therapy components during this session:     Chon reports that he has been extremely hot due to having his A/C unit not working currently.  However, he was able to handle the situation in a way in which he gave space and had the issue resolved in a way he felt seen and cared for.  Explored how this exemplified growth in his ability to handle unexpected challenges.  Followed up on his homework to explore the dream he had as representation of self.  He realized that there was a part of him looking for unconditional acceptance, from his own self and from others.  Explored ways he can continue to engage this acceptance.

## 2024-09-13 ENCOUNTER — APPOINTMENT (OUTPATIENT)
Dept: UROLOGY | Facility: CLINIC | Age: 59
End: 2024-09-13
Payer: COMMERCIAL

## 2024-09-13 DIAGNOSIS — F33.0 MILD EPISODE OF RECURRENT MAJOR DEPRESSIVE DISORDER (CMS-HCC): Primary | ICD-10-CM

## 2024-09-13 NOTE — PROGRESS NOTES
"  Start Time: 1:02  End Time: 1:59    Session format: Telehealth video visit  Session location: Outpatient clinic    Focus of treatment:   Problem List Items Addressed This Visit       Mild episode of recurrent major depressive disorder (CMS-HCC) - Primary       Session Content    The specific focus and goals for this session were mood management and interpersonal relationship building.     We addressed the following therapy components during this session:     Chon reports that he has been watching how limited his capacity for social engagement has been.  Reflected on history and his feeling of gratitude towards the ways this has shaped him towards healthier ways of being currently.  He expressed continued engagement of the metaphor of \"depression as a guide\" from prior sessions.  Helped to create some insights and reframing of current trends towards health and balance of personal need with needs of others.    "

## 2024-09-20 ENCOUNTER — APPOINTMENT (OUTPATIENT)
Dept: UROLOGY | Facility: CLINIC | Age: 59
End: 2024-09-20
Payer: COMMERCIAL

## 2024-09-20 DIAGNOSIS — F33.0 MILD EPISODE OF RECURRENT MAJOR DEPRESSIVE DISORDER (CMS-HCC): Primary | ICD-10-CM

## 2024-09-20 PROCEDURE — 90837 PSYTX W PT 60 MINUTES: CPT | Performed by: PSYCHOLOGIST

## 2024-09-20 PROCEDURE — 4010F ACE/ARB THERAPY RXD/TAKEN: CPT | Performed by: PSYCHOLOGIST

## 2024-09-21 NOTE — PROGRESS NOTES
"  Start Time: 1:0  End Time: 1:59    Session format: Telehealth video visit  Session location: Outpatient clinic    Focus of treatment:   Problem List Items Addressed This Visit       Mild episode of recurrent major depressive disorder (CMS-HCC) - Primary     Session Content    The specific focus and goals for this session were mood management and interpersonal relationship building.     We addressed the following therapy components during this session:     Chon reports that he recently had an experience while practicing medication in which he recognized that there are times where dropping a sense of \"self\" can help to be present in what is currently present.  He has applied this to other areas in his life and has found it to be useful in observing thoughts and feelings.  Processed through how this has allowed him to catch disruptions quicker and allow him to re-focus his attention.  He continues to feel more comfortable with his dietary changes.  Reflected on past misunderstandings in his life and a new perspective on witnessing those past situations with gratitude.  Reinforced Chon's progress and identified ways to continue to apply insights.   "

## 2024-09-27 ENCOUNTER — APPOINTMENT (OUTPATIENT)
Dept: UROLOGY | Facility: CLINIC | Age: 59
End: 2024-09-27
Payer: COMMERCIAL

## 2024-09-27 DIAGNOSIS — F33.0 MILD EPISODE OF RECURRENT MAJOR DEPRESSIVE DISORDER (CMS-HCC): Primary | ICD-10-CM

## 2024-09-27 PROCEDURE — 90837 PSYTX W PT 60 MINUTES: CPT | Performed by: PSYCHOLOGIST

## 2024-09-27 PROCEDURE — 4010F ACE/ARB THERAPY RXD/TAKEN: CPT | Performed by: PSYCHOLOGIST

## 2024-09-28 NOTE — PROGRESS NOTES
"  Start Time: 1:04  End Time: 1:59    Session format: Telehealth video visit  Session location: Outpatient clinic    Focus of treatment:   Problem List Items Addressed This Visit       Mild episode of recurrent major depressive disorder (CMS-HCC) - Primary       Session Content    The specific focus and goals for this session were mood management and interpersonal relationship building.     We addressed the following therapy components during this session:     Chon reports that he ended up going to the football game with his friend, however the conversation turned to [employer] and a statement that he needed to \"cast out demons\".  He disengaged from this conversation and went home.  Explored how these kinds of interactions start him in a cognitive loop that at times keeps him from noticing emotions.  Explored the ability to sit with painful memories through a different lens and how this has allowed him to see them as lessons on the path, and support his choices in living through action.  Chon reports that he feels good about his continued engagement of healthy lifestyle activities that have led to better health.    "

## 2024-10-04 ENCOUNTER — APPOINTMENT (OUTPATIENT)
Dept: UROLOGY | Facility: CLINIC | Age: 59
End: 2024-10-04
Payer: COMMERCIAL

## 2024-10-04 DIAGNOSIS — F33.0 MILD EPISODE OF RECURRENT MAJOR DEPRESSIVE DISORDER (CMS-HCC): Primary | ICD-10-CM

## 2024-10-04 PROCEDURE — 90837 PSYTX W PT 60 MINUTES: CPT | Performed by: PSYCHOLOGIST

## 2024-10-04 PROCEDURE — 4010F ACE/ARB THERAPY RXD/TAKEN: CPT | Performed by: PSYCHOLOGIST

## 2024-10-05 NOTE — PROGRESS NOTES
"  Start Time: :00  End Time: :59    Session format: Telehealth video visit  Session location: Outpatient clinic    Focus of treatment:   Problem List Items Addressed This Visit       Mild episode of recurrent major depressive disorder (CMS-HCC) - Primary     Session Content    The specific focus and goals for this session were mood management and interpersonal relationship building.     We addressed the following therapy components during this session:     Chon states that he feels like things have been \"feeling off\" lately.  Explored the potential of this being some grief.  He recently found out that a neighbor .  Explored the resonance of a close by death that activated residual grief over his family deaths.  Helped to contextualize and explored the way he has reframed death and his own mortality through these experiences.  He expressed interest in doing some goals towards purposeful life, identified some areas of exploration and action steps.    "

## 2024-10-11 ENCOUNTER — APPOINTMENT (OUTPATIENT)
Dept: UROLOGY | Facility: CLINIC | Age: 59
End: 2024-10-11
Payer: COMMERCIAL

## 2024-10-11 DIAGNOSIS — F33.0 MILD EPISODE OF RECURRENT MAJOR DEPRESSIVE DISORDER (CMS-HCC): Primary | ICD-10-CM

## 2024-10-11 PROCEDURE — 90837 PSYTX W PT 60 MINUTES: CPT | Performed by: PSYCHOLOGIST

## 2024-10-11 PROCEDURE — 4010F ACE/ARB THERAPY RXD/TAKEN: CPT | Performed by: PSYCHOLOGIST

## 2024-10-12 NOTE — PROGRESS NOTES
Start Time: 1:00  End Time: 1:59    Session format: Telehealth video visit  Session location: Outpatient clinic    Focus of treatment:   Problem List Items Addressed This Visit       Mild episode of recurrent major depressive disorder (CMS-HCC) - Primary       Session Content    The specific focus and goals for this session were mood management and interpersonal relationship building.     We addressed the following therapy components during this session:     Chon reflected on changes in diet and weight, and expressed he feels good about the progress he has made.  Explored his continued sleep difficulties and ideas around sleep hygiene changes.  Identified psychological flexibility with food he has developed, and explored ways to carry that into sleep habits.  Revisited ideas of purpose and reflection on mortality from prior session.  Identified elements of grief and the self reflection that he has been able to do in regard to values and life choice.

## 2024-10-18 ENCOUNTER — APPOINTMENT (OUTPATIENT)
Dept: UROLOGY | Facility: CLINIC | Age: 59
End: 2024-10-18
Payer: COMMERCIAL

## 2024-10-19 NOTE — PROGRESS NOTES
"  Start Time: 1:00  End Time: 1:59    Session format: Telehealth video visit  Session location: Outpatient clinic    Focus of treatment:   Problem List Items Addressed This Visit       Mild episode of recurrent major depressive disorder (CMS-HCC) - Primary     Session Content    The specific focus and goals for this session were mood management and interpersonal relationship building.     We addressed the following therapy components during this session:     Chon states that he got blue light glasses to assist him with sleep and is interested to see if it helps in the long run.  He states that he feels he has noticed he gets more tired when using them with a screen.  He reports feeling like he has made progress with sleep hygiene.  Additionally gotten together with his sister and his uncle.  He has noticed the balance of habit and the trend of excitement to boredom. Explored cognitive process that leads to being \"bored\" and identified strategies to defuse and maintain developing positive habits.    "

## 2024-10-25 ENCOUNTER — APPOINTMENT (OUTPATIENT)
Dept: UROLOGY | Facility: CLINIC | Age: 59
End: 2024-10-25
Payer: COMMERCIAL

## 2024-10-25 DIAGNOSIS — F33.0 MILD EPISODE OF RECURRENT MAJOR DEPRESSIVE DISORDER (CMS-HCC): Primary | ICD-10-CM

## 2024-10-25 PROCEDURE — 4010F ACE/ARB THERAPY RXD/TAKEN: CPT | Performed by: PSYCHOLOGIST

## 2024-10-25 PROCEDURE — 90837 PSYTX W PT 60 MINUTES: CPT | Performed by: PSYCHOLOGIST

## 2024-11-01 ENCOUNTER — APPOINTMENT (OUTPATIENT)
Dept: UROLOGY | Facility: CLINIC | Age: 59
End: 2024-11-01
Payer: COMMERCIAL

## 2024-11-01 DIAGNOSIS — F33.0 MILD EPISODE OF RECURRENT MAJOR DEPRESSIVE DISORDER (CMS-HCC): Primary | ICD-10-CM

## 2024-11-01 PROCEDURE — 4010F ACE/ARB THERAPY RXD/TAKEN: CPT | Performed by: PSYCHOLOGIST

## 2024-11-01 PROCEDURE — 90837 PSYTX W PT 60 MINUTES: CPT | Performed by: PSYCHOLOGIST

## 2024-11-05 NOTE — PROGRESS NOTES
"  Start Time: 1:00  End Time: 1:58    Session format: Telehealth video visit  Session location: Outpatient clinic    Focus of treatment:   Problem List Items Addressed This Visit       Mild episode of recurrent major depressive disorder (CMS-HCC) - Primary       Session Content    The specific focus and goals for this session were mood management and interpersonal relationship building.     We addressed the following therapy components during this session:     Chon states that he has continued to focus on ways to improve his health and functioning.  He is currently contemplating going to a gym and found that he can get a membership to the [employer].  Explored how his improved ability to slow down his automatic thought responses has allowed him to notice things more and have different opportunities.  He has engaged social by reaching out to an old friend.  Explored the growing capacity to defuse from the \"I need to always be nice to be good.\"  Worked to continue to frame the internal motivations based on past expectation and modify \"I am good because I do things for people\" towards \"I am good, and sometimes I do things for people\".    "

## 2024-11-08 ENCOUNTER — APPOINTMENT (OUTPATIENT)
Dept: UROLOGY | Facility: CLINIC | Age: 59
End: 2024-11-08
Payer: COMMERCIAL

## 2024-11-08 DIAGNOSIS — F33.0 MILD EPISODE OF RECURRENT MAJOR DEPRESSIVE DISORDER (CMS-HCC): Primary | ICD-10-CM

## 2024-11-08 PROCEDURE — 90837 PSYTX W PT 60 MINUTES: CPT | Performed by: PSYCHOLOGIST

## 2024-11-08 PROCEDURE — 4010F ACE/ARB THERAPY RXD/TAKEN: CPT | Performed by: PSYCHOLOGIST

## 2024-11-14 NOTE — PROGRESS NOTES
"  Start Time: 1:00  End Time: 1:58    Session format: Telehealth video visit  Session location: Outpatient clinic    Focus of treatment:   Problem List Items Addressed This Visit       Mild episode of recurrent major depressive disorder (CMS-HCC) - Primary     Session Content    The specific focus and goals for this session were mood management and interpersonal relationship building.     We addressed the following therapy components during this session:     Chon expressed working on some insight he had that \"not everything that changes, needs restored.\" Explored how this insight has affected his ability to continue to make progress in his life.  He states that diet changes continue to go well, and he is finding ways to keep that progress fresh.  Additionally, he has noticed that with positive body changes, he notices new judgements.  Explored how to work with this awareness and continue the trend of body acceptance.  Processed through the ways he has learned how to accept sacrifices he is willing to make now that he has decided to keep living.  He identified the reframe of \"I can't have\" to \"I get to have\" as helpful in making these changes along side of the forward perspective of discovery process vs. A specific destination focus.  Identified some areas of focus to continue to explore prior to next session.   "

## 2024-11-15 ENCOUNTER — APPOINTMENT (OUTPATIENT)
Dept: UROLOGY | Facility: CLINIC | Age: 59
End: 2024-11-15
Payer: COMMERCIAL

## 2024-11-15 DIAGNOSIS — F33.0 MILD EPISODE OF RECURRENT MAJOR DEPRESSIVE DISORDER (CMS-HCC): Primary | ICD-10-CM

## 2024-11-15 PROCEDURE — 90837 PSYTX W PT 60 MINUTES: CPT | Performed by: PSYCHOLOGIST

## 2024-11-15 PROCEDURE — 4010F ACE/ARB THERAPY RXD/TAKEN: CPT | Performed by: PSYCHOLOGIST

## 2024-11-19 NOTE — PROGRESS NOTES
Start Time: 1:00  End Time: 1:58    Session format: Telehealth video visit  Session location: Outpatient clinic    Focus of treatment:   Problem List Items Addressed This Visit       Mild episode of recurrent major depressive disorder (CMS-HCC) - Primary       Session Content    The specific focus and goals for this session were mood management and interpersonal relationship building.     We addressed the following therapy components during this session:     Chon started the session reflecting on his ability to internalize his meditation practice in a way that he feels has allowed him to more appropriately respond to situations within what feels like a proportionate response to the situation.  He has also been sleeping more regularly and is noticing the effect that has had in combination with the other physical body changes he has been doing.  Examined his ability to be more choiceful in following  his bio-rhythms and the impact it seems to have on overall health and functioning.  Explored the idea of changing to every other week for session given how much progress he has made, and mutually agreed that it is time to decrease given that positive change.

## 2024-11-22 ENCOUNTER — APPOINTMENT (OUTPATIENT)
Dept: UROLOGY | Facility: CLINIC | Age: 59
End: 2024-11-22
Payer: COMMERCIAL

## 2024-11-22 DIAGNOSIS — F33.0 MILD EPISODE OF RECURRENT MAJOR DEPRESSIVE DISORDER (CMS-HCC): Primary | ICD-10-CM

## 2024-11-22 PROCEDURE — 90837 PSYTX W PT 60 MINUTES: CPT | Performed by: PSYCHOLOGIST

## 2024-11-22 PROCEDURE — 4010F ACE/ARB THERAPY RXD/TAKEN: CPT | Performed by: PSYCHOLOGIST

## 2024-11-29 ENCOUNTER — APPOINTMENT (OUTPATIENT)
Dept: UROLOGY | Facility: CLINIC | Age: 59
End: 2024-11-29
Payer: COMMERCIAL

## 2024-11-29 NOTE — PROGRESS NOTES
Start Time: 1:00  End Time: 1:58    Session format: Telehealth video visit  Session location: Outpatient clinic    Focus of treatment:   Problem List Items Addressed This Visit       Mild episode of recurrent major depressive disorder (CMS-HCC) - Primary       Session Content    The specific focus and goals for this session were mood management and interpersonal relationship building.     We addressed the following therapy components during this session:     Chon started that he has started to engage in chiropractic and acupuncture for his pain management, and feels that is has been helpful so far.  He is game planning for eating at the thanksgiving he will be cooking for.  Explored how he has learned to apply the concept of having structure versus rigidity in his plan has been helpful. Processed through a few challenges that have come up and reinforced his ability to make adjustments in the moment to allow for psychological flexibility.  Processed through the plan to start to reduce frequency of session to every other week given his progress.

## 2024-12-04 NOTE — PROGRESS NOTES
"  Start Time: 11:05  End Time: 12:02    Session format: Telehealth video visit  Session location: Outpatient clinic    Focus of treatment:   Problem List Items Addressed This Visit       Mild episode of recurrent major depressive disorder (CMS-HCC) - Primary     Session Content    The specific focus and goals for this session were mood management and interpersonal relationship building.     We addressed the following therapy components during this session:     Chon expressed his awareness of how he has improved in his navigation of \"humans being humans\", and the way he has been able to choose levels of engagement and the ability to say \"no\" more comfortably.  Explored the sense of being able to make choice of values versus meeting obligation.  He was able to identify times that he has been able to choose to engage others because of the value he holds and to adjust to unplanned and planned routine changes effectively and with psychological flexibility.  Chon expressed that he has been noticing the rodrigo of a \"sense of purpose\" coming up for him lately.  Explored the idea of purpose through an open perspective rather than a fixed idea, allowing his ability to engage value system in a emergent way.    "

## 2024-12-06 ENCOUNTER — APPOINTMENT (OUTPATIENT)
Dept: UROLOGY | Facility: CLINIC | Age: 59
End: 2024-12-06
Payer: COMMERCIAL

## 2024-12-06 DIAGNOSIS — F33.0 MILD EPISODE OF RECURRENT MAJOR DEPRESSIVE DISORDER (CMS-HCC): Primary | ICD-10-CM

## 2024-12-06 PROCEDURE — 90837 PSYTX W PT 60 MINUTES: CPT | Performed by: PSYCHOLOGIST

## 2024-12-06 PROCEDURE — 4010F ACE/ARB THERAPY RXD/TAKEN: CPT | Performed by: PSYCHOLOGIST

## 2024-12-13 ENCOUNTER — APPOINTMENT (OUTPATIENT)
Dept: UROLOGY | Facility: CLINIC | Age: 59
End: 2024-12-13
Payer: COMMERCIAL

## 2024-12-13 DIAGNOSIS — F33.0 MILD EPISODE OF RECURRENT MAJOR DEPRESSIVE DISORDER (CMS-HCC): Primary | ICD-10-CM

## 2024-12-13 PROCEDURE — 4010F ACE/ARB THERAPY RXD/TAKEN: CPT | Performed by: PSYCHOLOGIST

## 2024-12-13 PROCEDURE — 90837 PSYTX W PT 60 MINUTES: CPT | Performed by: PSYCHOLOGIST

## 2024-12-16 NOTE — PROGRESS NOTES
Start Time: 1:00  End Time: 1:58    Session format: Telehealth video visit  Session location: Outpatient clinic    Focus of treatment:   Problem List Items Addressed This Visit       Mild episode of recurrent major depressive disorder (CMS-HCC) - Primary       Session Content    The specific focus and goals for this session were mood management and interpersonal relationship building.     We addressed the following therapy components during this session:     Chon expressed feeling happy about the improvements he has had on his health journey, and reflected on having dropped 75 lbs. and improved health metrics in areas such as blood sugar and other important areas.  He felt that his holiday time went well, and reflected on the value of connecting with family and connecting with a past friendship.  Explored his continued ability to navigate openness and vulnerability with people in his life in a way that feels more satisfying.

## 2024-12-20 ENCOUNTER — APPOINTMENT (OUTPATIENT)
Dept: UROLOGY | Facility: CLINIC | Age: 59
End: 2024-12-20
Payer: COMMERCIAL

## 2024-12-20 DIAGNOSIS — F33.0 MILD EPISODE OF RECURRENT MAJOR DEPRESSIVE DISORDER (CMS-HCC): Primary | ICD-10-CM

## 2024-12-20 PROCEDURE — 4010F ACE/ARB THERAPY RXD/TAKEN: CPT | Performed by: PSYCHOLOGIST

## 2024-12-20 PROCEDURE — 90837 PSYTX W PT 60 MINUTES: CPT | Performed by: PSYCHOLOGIST

## 2024-12-27 ENCOUNTER — APPOINTMENT (OUTPATIENT)
Dept: UROLOGY | Facility: CLINIC | Age: 59
End: 2024-12-27
Payer: COMMERCIAL

## 2024-12-27 NOTE — PROGRESS NOTES
Start Time: 1:00  End Time: 1:58    Session format: Telehealth video visit  Session location: Outpatient clinic    Focus of treatment:   Problem List Items Addressed This Visit       Mild episode of recurrent major depressive disorder (CMS-HCC) - Primary     Session Content    The specific focus and goals for this session were mood management and interpersonal relationship building.     We addressed the following therapy components during this session:     Chon stated that he is continuing to watch his bio-indicators and making adjustments. He expressed feeling good about how well he has done with diet and exercise and the fact that he has been able to maintain some consistency and not getting bored.  He is navigating new connections he has made at the [facility], and recognizes how nice it feels to have bi-directional relationships in a way that expresses care mutually.  Explored his focus on hospitality as a go to for relationships he has and tied that to his expression of values.

## 2025-01-02 NOTE — PROGRESS NOTES
"  Start Time: 1:00  End Time: 1:58    Session format: Telehealth video visit  Session location: Outpatient clinic    Focus of treatment:   Problem List Items Addressed This Visit       Mild episode of recurrent major depressive disorder (CMS-HCC) - Primary       Session Content    The specific focus and goals for this session were mood management and interpersonal relationship building.     We addressed the following therapy components during this session:     Chon stated that he has been exploring his experience of emotional variance in ways that still feel new.  In particular, he is recognizing his ability to notice emotional change and not assigned greater meaning to it other than what he is feeling in the moment.  Explored his medication practice and the question about what to do when he notices fused thoughts returning during meditation.  Chon is also continuing to work on ensuring he is scheduling open time for himself, as he has noticed that he vacillates between \"lots of open time\" to \"no open time\", and working to balance that. Explored the value he has in social connection and quite time alone, and how to navigate when life necessitates change.  Processed through his growing capacity to challenge his own assumptions of control in situations and find the values focused actions within his capacity, and he used his upcoming housing re-certification as an example of that process.   "

## 2025-01-03 ENCOUNTER — APPOINTMENT (OUTPATIENT)
Dept: UROLOGY | Facility: CLINIC | Age: 60
End: 2025-01-03
Payer: COMMERCIAL

## 2025-01-03 DIAGNOSIS — F33.0 MILD EPISODE OF RECURRENT MAJOR DEPRESSIVE DISORDER (CMS-HCC): Primary | ICD-10-CM

## 2025-01-03 PROCEDURE — 90837 PSYTX W PT 60 MINUTES: CPT | Performed by: PSYCHOLOGIST

## 2025-01-03 PROCEDURE — 4010F ACE/ARB THERAPY RXD/TAKEN: CPT | Performed by: PSYCHOLOGIST

## 2025-01-17 ENCOUNTER — APPOINTMENT (OUTPATIENT)
Dept: UROLOGY | Facility: CLINIC | Age: 60
End: 2025-01-17
Payer: COMMERCIAL

## 2025-01-17 DIAGNOSIS — F33.0 MILD EPISODE OF RECURRENT MAJOR DEPRESSIVE DISORDER (CMS-HCC): Primary | ICD-10-CM

## 2025-01-17 PROCEDURE — 4010F ACE/ARB THERAPY RXD/TAKEN: CPT | Performed by: PSYCHOLOGIST

## 2025-01-17 PROCEDURE — 90837 PSYTX W PT 60 MINUTES: CPT | Performed by: PSYCHOLOGIST

## 2025-01-31 ENCOUNTER — APPOINTMENT (OUTPATIENT)
Dept: UROLOGY | Facility: CLINIC | Age: 60
End: 2025-01-31
Payer: COMMERCIAL

## 2025-01-31 DIAGNOSIS — F33.0 MILD EPISODE OF RECURRENT MAJOR DEPRESSIVE DISORDER (CMS-HCC): Primary | ICD-10-CM

## 2025-01-31 PROCEDURE — 90837 PSYTX W PT 60 MINUTES: CPT | Performed by: PSYCHOLOGIST

## 2025-01-31 PROCEDURE — 4010F ACE/ARB THERAPY RXD/TAKEN: CPT | Performed by: PSYCHOLOGIST

## 2025-02-10 NOTE — PROGRESS NOTES
"  Start Time: 11:00  End Time: 11:58    Session format: Telehealth video visit  Session location: Outpatient clinic    Focus of treatment:   Problem List Items Addressed This Visit       Mild episode of recurrent major depressive disorder (CMS-HCC) - Primary     Session Content    The specific focus and goals for this session were mood management and interpersonal relationship building.     We addressed the following therapy components during this session:     Chon stated that he has been under the weather with a cold lately.  Explored his recognition that lifestyle changes have seemed to become more set in habit, and did not fall off with illness.  He is going to visit a friend in [place], which is reportedly a welcome socialization.  Explored the ability Chon has developed to see his change over time and the capacity to recognize choice more proximal.  Explored his expressed internal feelings of animosity towards his ex-wife, and his question about whether it was \"getting in the way\" of his ability to express kindness in the world.  Explored the function that it had to help him set boundaries, and the residual feeling of \"not good\".  Explored forgiveness as relational and personal and how letting go may free him emotionally, while continuing to set healthy boundaries for himself   "

## 2025-02-14 ENCOUNTER — APPOINTMENT (OUTPATIENT)
Dept: UROLOGY | Facility: CLINIC | Age: 60
End: 2025-02-14
Payer: COMMERCIAL

## 2025-02-14 DIAGNOSIS — F33.0 MILD EPISODE OF RECURRENT MAJOR DEPRESSIVE DISORDER (CMS-HCC): Primary | ICD-10-CM

## 2025-02-14 PROCEDURE — 90837 PSYTX W PT 60 MINUTES: CPT | Performed by: PSYCHOLOGIST

## 2025-02-14 PROCEDURE — 4010F ACE/ARB THERAPY RXD/TAKEN: CPT | Performed by: PSYCHOLOGIST

## 2025-02-28 ENCOUNTER — APPOINTMENT (OUTPATIENT)
Dept: UROLOGY | Facility: CLINIC | Age: 60
End: 2025-02-28
Payer: COMMERCIAL

## 2025-02-28 DIAGNOSIS — F33.0 MILD EPISODE OF RECURRENT MAJOR DEPRESSIVE DISORDER (CMS-HCC): Primary | ICD-10-CM

## 2025-02-28 PROCEDURE — 90837 PSYTX W PT 60 MINUTES: CPT | Performed by: PSYCHOLOGIST

## 2025-02-28 PROCEDURE — 4010F ACE/ARB THERAPY RXD/TAKEN: CPT | Performed by: PSYCHOLOGIST

## 2025-03-05 NOTE — PROGRESS NOTES
"  Start Time: 11:00  End Time: 11:58    Session format: Telehealth video visit  Session location: Outpatient clinic    Focus of treatment:   Problem List Items Addressed This Visit       Mild episode of recurrent major depressive disorder (CMS-HCC) - Primary       Session Content    The specific focus and goals for this session were mood management and interpersonal relationship building.     We addressed the following therapy components during this session:     Chon stated that he has had a \"challenging couple of weeks\". He went and visited with his friend, and recognized that he got agitated some at the way that his habits were affected by being away visiting.  He was able to re-focus his perspective on the parts he could do, and look for whether he actually lost anything from the challenge.  Processed through the balance of having habits and repetition to help with structuring days and health activities versus being \"rigid\".  Explored ways to navigate through core helpful habits when change is necessary.   "

## 2025-03-14 ENCOUNTER — APPOINTMENT (OUTPATIENT)
Dept: UROLOGY | Facility: CLINIC | Age: 60
End: 2025-03-14
Payer: COMMERCIAL

## 2025-03-14 DIAGNOSIS — F33.0 MILD EPISODE OF RECURRENT MAJOR DEPRESSIVE DISORDER (CMS-HCC): Primary | ICD-10-CM

## 2025-03-14 PROCEDURE — 4010F ACE/ARB THERAPY RXD/TAKEN: CPT | Performed by: PSYCHOLOGIST

## 2025-03-14 PROCEDURE — 90837 PSYTX W PT 60 MINUTES: CPT | Performed by: PSYCHOLOGIST

## 2025-03-14 NOTE — PROGRESS NOTES
"  Start Time: 11:00  End Time: 11:58    Session format: Telehealth video visit  Session location: Outpatient clinic    Focus of treatment:   Problem List Items Addressed This Visit       Mild episode of recurrent major depressive disorder (CMS-HCC) - Primary       Session Content    The specific focus and goals for this session were mood management and interpersonal relationship building.     We addressed the following therapy components during this session:     Chon stated that he been interested in navigating some of the similarities and differences in [employer]ian and Yazidi philosophy and how that has impacted him as a [employer] who attends [employer] practice sessions.  Additionally, he has been continuing to engage his exploration of hospitality and relationships with others.  Most recently this [facility] to his door.  He has noticed some challenge in working on the \"being\" parts of human experience, while having improved on the \"doing\" parts.  Explored his history of being taught and seen as \"wrong\" and how this continues to show up at times in regard to wanting to be \"right\" and how he has learned to defuse from this when he gets agitated.  Chon also reports that with his health goals now being more stable, he is working on shifting his mindset from \"repair\" to \"optimal health\" focus.  Identified ways that this mindset shift interacts with choices.    "

## 2025-03-25 NOTE — PROGRESS NOTES
"  Start Time: 11:00  End Time: 11:58    Session format: Telehealth video visit  Session location: Outpatient clinic    Focus of treatment:   Problem List Items Addressed This Visit       Mild episode of recurrent major depressive disorder (CMS-HCC) - Primary       Session Content    The specific focus and goals for this session were mood management and interpersonal relationship building.     We addressed the following therapy components during this session:     Chon stated that he continues to \"ride the ups and downs of life\", and reflected on how pleased he is at the positive way he has been able to make changes.  Explored the importance of language and meaning, particular in discussions with others and assumptions that the words used mean the same thing to all.  Utilized the \"adding ingredients\" metaphor to explore relationship dynamics.Facilitated processing through some examples of what Chon has observed in himself and others regarding the discomfort of life and how to navigate comfort in going through those challenges.  Chon expressed working to balance between being scheduled but open versus rigid.  Identified areas to explore in making those tweaks to his schedule.   "

## 2025-03-28 ENCOUNTER — APPOINTMENT (OUTPATIENT)
Dept: UROLOGY | Facility: CLINIC | Age: 60
End: 2025-03-28
Payer: COMMERCIAL

## 2025-03-28 DIAGNOSIS — F33.0 MILD EPISODE OF RECURRENT MAJOR DEPRESSIVE DISORDER (CMS-HCC): Primary | ICD-10-CM

## 2025-03-28 PROCEDURE — 4010F ACE/ARB THERAPY RXD/TAKEN: CPT | Performed by: PSYCHOLOGIST

## 2025-03-28 PROCEDURE — 90837 PSYTX W PT 60 MINUTES: CPT | Performed by: PSYCHOLOGIST

## 2025-03-31 NOTE — PROGRESS NOTES
"  Start Time: 11:00  End Time: 11:58    Session format: Telehealth video visit  Session location: Outpatient clinic    Focus of treatment:   Problem List Items Addressed This Visit       Mild episode of recurrent major depressive disorder (CMS-HCC) - Primary     Session Content    The specific focus and goals for this session were mood management and interpersonal relationship building.     We addressed the following therapy components during this session:     Chon stated that he has been busy lately, but most of it has been engaging in meaningful activities. 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 Reinforced his desire to engage, and provided support for his motivation.  Processed through his engagement of focusing on the spirit of living a value, looking at Accidents vs. Substance and working on navigating when he feels \"off balance\".   "

## 2025-04-09 NOTE — PROGRESS NOTES
"  Start Time: 11:05  End Time: 12:05    Session format: Telehealth video visit  Session location: Outpatient clinic    Focus of treatment:   Problem List Items Addressed This Visit       Mild episode of recurrent major depressive disorder (CMS-HCC) - Primary       Session Content    The specific focus and goals for this session were mood management and interpersonal relationship building.     We addressed the following therapy components during this session:     Chon stated that he has been noticing that he has continued to navigate the \"ups and downs\", but with a growing ability to not get caught up in it the way he used to.  He has continued to further relationships developed through spiritual engagement and volunteer work.  Explored his navigation of addressing issues that he has seen without getting stuck on outcomes. In particular processed through a situation he had with a person working at the food pantry.  Explored the socio-political drain he has been experiencing and identified potential ways to address and handle this consistent with his own values.   "

## 2025-04-11 ENCOUNTER — APPOINTMENT (OUTPATIENT)
Dept: UROLOGY | Facility: CLINIC | Age: 60
End: 2025-04-11
Payer: COMMERCIAL

## 2025-04-11 DIAGNOSIS — F33.0 MILD EPISODE OF RECURRENT MAJOR DEPRESSIVE DISORDER (CMS-HCC): Primary | ICD-10-CM

## 2025-04-11 PROCEDURE — 90837 PSYTX W PT 60 MINUTES: CPT | Performed by: PSYCHOLOGIST

## 2025-04-11 PROCEDURE — 4010F ACE/ARB THERAPY RXD/TAKEN: CPT | Performed by: PSYCHOLOGIST

## 2025-04-21 NOTE — PROGRESS NOTES
"  Start Time: 11:00  End Time: 11:58    Session format: Telehealth video visit  Session location: Outpatient clinic    Focus of treatment:   Problem List Items Addressed This Visit       Mild episode of recurrent major depressive disorder (CMS-HCC) - Primary     Session Content    The specific focus and goals for this session were mood management and interpersonal relationship building.     We addressed the following therapy components during this session:     Chon stated that he has been working to address concerns that he has about Medicaid and the current sociopolitical trends.  He decided to send a letter, stating that he is understanding that those are the kinds of things he can do and have control over.  He has been \"busy full time\", stating that this has been tiring, but not in a bad way.  He has been working on adding in practices like Yoganidra to help with feeling wakeful, and states that he has found benefit in the practice.  Explored his enjoyment of cross tiffany practice and conversations and how this has felt fulfilling to him. He recognized that there is a present thought that keeps presenting as \"I was an asshole\" regarding his past versions of self.  Processed and helped to defuse this though, reinforcing his current sense of self awareness and fulfillment and focus on values oriented action in current life.    "

## 2025-04-25 ENCOUNTER — APPOINTMENT (OUTPATIENT)
Dept: UROLOGY | Facility: CLINIC | Age: 60
End: 2025-04-25
Payer: COMMERCIAL

## 2025-04-28 ENCOUNTER — APPOINTMENT (OUTPATIENT)
Dept: UROLOGY | Facility: CLINIC | Age: 60
End: 2025-04-28
Payer: COMMERCIAL

## 2025-04-28 DIAGNOSIS — F33.0 MILD EPISODE OF RECURRENT MAJOR DEPRESSIVE DISORDER (CMS-HCC): Primary | ICD-10-CM

## 2025-04-28 PROCEDURE — 90837 PSYTX W PT 60 MINUTES: CPT | Performed by: PSYCHOLOGIST

## 2025-04-28 PROCEDURE — 4010F ACE/ARB THERAPY RXD/TAKEN: CPT | Performed by: PSYCHOLOGIST

## 2025-04-28 NOTE — PROGRESS NOTES
"  Start Time: 11:00  End Time: 11:58    Session format: Telehealth video visit  Session location: Outpatient clinic    Focus of treatment:   Problem List Items Addressed This Visit       Mild episode of recurrent major depressive disorder (CMS-HCC) - Primary       Session Content    The specific focus and goals for this session were mood management and interpersonal relationship building.     We addressed the following therapy components during this session:     Chon reports that he has been feeling really good about himself lately. Processed through how his health journey has gone from \"maintenance\" to \"optimal health\".  Explored how this concept goes to mental health as well and processed through what it means to have \"optimal mental health\" focused on psychological flexibility and the concepts of regulation.  Explored the overlap of mental and physiological health and how to engage in ongoing engagement of things that allow him to grow. Reinforced his growth and how past experiences helped to shape who he is currently, and his ability to both feel successful in his day to day activities, but also the allowance of kindness towards his own self as well.     "

## 2025-05-09 ENCOUNTER — APPOINTMENT (OUTPATIENT)
Dept: UROLOGY | Facility: CLINIC | Age: 60
End: 2025-05-09
Payer: COMMERCIAL

## 2025-05-09 DIAGNOSIS — F33.0 MILD EPISODE OF RECURRENT MAJOR DEPRESSIVE DISORDER: Primary | ICD-10-CM

## 2025-05-09 PROCEDURE — 90837 PSYTX W PT 60 MINUTES: CPT | Performed by: PSYCHOLOGIST

## 2025-05-09 PROCEDURE — 4010F ACE/ARB THERAPY RXD/TAKEN: CPT | Performed by: PSYCHOLOGIST

## 2025-05-23 ENCOUNTER — APPOINTMENT (OUTPATIENT)
Dept: UROLOGY | Facility: CLINIC | Age: 60
End: 2025-05-23
Payer: COMMERCIAL

## 2025-05-23 DIAGNOSIS — F33.0 MILD EPISODE OF RECURRENT MAJOR DEPRESSIVE DISORDER: Primary | ICD-10-CM

## 2025-05-23 PROCEDURE — 90837 PSYTX W PT 60 MINUTES: CPT | Performed by: PSYCHOLOGIST

## 2025-05-23 PROCEDURE — 4010F ACE/ARB THERAPY RXD/TAKEN: CPT | Performed by: PSYCHOLOGIST

## 2025-06-02 ENCOUNTER — APPOINTMENT (OUTPATIENT)
Dept: UROLOGY | Facility: CLINIC | Age: 60
End: 2025-06-02
Payer: COMMERCIAL

## 2025-06-02 DIAGNOSIS — F33.0 MILD EPISODE OF RECURRENT MAJOR DEPRESSIVE DISORDER: Primary | ICD-10-CM

## 2025-06-02 PROCEDURE — 90837 PSYTX W PT 60 MINUTES: CPT | Performed by: PSYCHOLOGIST

## 2025-06-02 PROCEDURE — 4010F ACE/ARB THERAPY RXD/TAKEN: CPT | Performed by: PSYCHOLOGIST

## 2025-06-05 NOTE — PROGRESS NOTES
"  Start Time: 11:00  End Time: 12:05    Session format: Telehealth video visit  Session location: Outpatient clinic    Focus of treatment:   Problem List Items Addressed This Visit       Mild episode of recurrent major depressive disorder - Primary     Session Content    The specific focus and goals for this session were mood management and interpersonal relationship building.     We addressed the following therapy components during this session:     Chon reports that he has been adjusting to the changes recently. Processed through the past week of eating patterns and the impact that had on him. Reinforced his ability to engage in radical self acceptance and love and how he was able to go \"back to the basics\" for himself and what has worked over the past year.  He had an MRI on his shoulder and will be having surgery on June 6th to correct the issues with his rotator cuff.  Additionally, he was able to have an interaction with the  at the [facility] about his disappointment. Explored the many ways he is engaging his energy towards positive things in his life.    "

## 2025-06-06 ENCOUNTER — APPOINTMENT (OUTPATIENT)
Dept: UROLOGY | Facility: CLINIC | Age: 60
End: 2025-06-06
Payer: COMMERCIAL

## 2025-06-17 NOTE — PROGRESS NOTES
"  Start Time: 11:00  End Time: 12:05    Session format: Telehealth video visit  Session location: Outpatient clinic    Focus of treatment:   Problem List Items Addressed This Visit       Mild episode of recurrent major depressive disorder - Primary       Session Content    The specific focus and goals for this session were mood management and interpersonal relationship building.     We addressed the following therapy components during this session:     Chon reflected on his growing awareness of wanting to be desired by others. Explored internal need to \"be desired\" and the interaction with external appraisal of \"being desirable\" and the challenge of internal needs met with external and internal validation.  Engaged Chon in exploration around the acknowledgement of this need and how that influences his wish to be like. Processed through his shift to recognize he often didn't feel desirable due to body image in prior times, and how this may have been sublimated to other things that felt more attainable or capable.  He has been grateful of some recent social reach outs by others, recognizing that his efforts in the past have come around to this situation. Utilized \"the Carrot Seed\" as a metaphor for putting in effort for future returns.  Facilitated reflection on his current perspective on most recent challenges and the appreciation he has for how he has been able to respond to them differently that years prior.  Chon has surgery on his shoulder this coming Friday.    "

## 2025-06-20 ENCOUNTER — APPOINTMENT (OUTPATIENT)
Dept: UROLOGY | Facility: CLINIC | Age: 60
End: 2025-06-20
Payer: COMMERCIAL

## 2025-06-20 DIAGNOSIS — F33.41 RECURRENT MAJOR DEPRESSIVE DISORDER, IN PARTIAL REMISSION: Primary | ICD-10-CM

## 2025-06-20 PROCEDURE — 4010F ACE/ARB THERAPY RXD/TAKEN: CPT | Performed by: PSYCHOLOGIST

## 2025-06-20 PROCEDURE — 90837 PSYTX W PT 60 MINUTES: CPT | Performed by: PSYCHOLOGIST

## 2025-06-30 ENCOUNTER — APPOINTMENT (OUTPATIENT)
Dept: UROLOGY | Facility: CLINIC | Age: 60
End: 2025-06-30
Payer: COMMERCIAL

## 2025-06-30 DIAGNOSIS — F33.41 RECURRENT MAJOR DEPRESSIVE DISORDER, IN PARTIAL REMISSION: Primary | ICD-10-CM

## 2025-06-30 PROCEDURE — 90837 PSYTX W PT 60 MINUTES: CPT | Performed by: PSYCHOLOGIST

## 2025-06-30 PROCEDURE — 4010F ACE/ARB THERAPY RXD/TAKEN: CPT | Performed by: PSYCHOLOGIST

## 2025-07-02 NOTE — PROGRESS NOTES
"  Start Time: 11:00  End Time: 11:59    Session format: Telehealth video visit  Session location: Outpatient clinic    Focus of treatment:   Problem List Items Addressed This Visit       Mild episode of recurrent major depressive disorder - Primary       Session Content    The specific focus and goals for this session were mood management and interpersonal relationship building.     We addressed the following therapy components during this session:     Chon states that he has had a \"roller coaster couple of weeks\". His sleep has been off, diet has been off, and he has felt a bit emotionally \"off\".  Explored factors that are contributing to the disruption, and he identified some insight into the anxiety he feels about his daughter's upcoming wedding.  In addition to this, his insurance reportedly denied coverage for his shoulder surgery, and thus it has been delayed until August to allow his surgeon time to work with insurance.  Chon states that he has felt successful in circumventing \"all or none thinking\". Reinforced his capacity to \"ride the roller coaster\" without getting thrown off, exemplifying psychological flexibility. Explored his current progress in navigating social relationships, and the difference in how he takes time to himself currently. Examined the difference between \"Time alone for myself\" versus \"Time alone to isolate\". Chon expressed some difficulties with feelings of guilt that have shown up recently. Explored the empathy to guilt connection and offered some reframing to assist with defusion from guilt.   "

## 2025-07-12 PROBLEM — F33.41 RECURRENT MAJOR DEPRESSIVE DISORDER, IN PARTIAL REMISSION: Status: ACTIVE | Noted: 2025-07-12

## 2025-07-18 ENCOUNTER — APPOINTMENT (OUTPATIENT)
Dept: UROLOGY | Facility: CLINIC | Age: 60
End: 2025-07-18
Payer: COMMERCIAL

## 2025-07-18 DIAGNOSIS — F33.41 RECURRENT MAJOR DEPRESSIVE DISORDER, IN PARTIAL REMISSION: Primary | ICD-10-CM

## 2025-07-18 PROCEDURE — 4010F ACE/ARB THERAPY RXD/TAKEN: CPT | Performed by: PSYCHOLOGIST

## 2025-07-18 PROCEDURE — 90837 PSYTX W PT 60 MINUTES: CPT | Performed by: PSYCHOLOGIST

## 2025-08-01 ENCOUNTER — APPOINTMENT (OUTPATIENT)
Dept: UROLOGY | Facility: CLINIC | Age: 60
End: 2025-08-01
Payer: COMMERCIAL

## 2025-08-01 DIAGNOSIS — F33.41 RECURRENT MAJOR DEPRESSIVE DISORDER, IN PARTIAL REMISSION: Primary | ICD-10-CM

## 2025-08-01 PROCEDURE — 90837 PSYTX W PT 60 MINUTES: CPT | Performed by: PSYCHOLOGIST

## 2025-08-01 PROCEDURE — 4010F ACE/ARB THERAPY RXD/TAKEN: CPT | Performed by: PSYCHOLOGIST

## 2025-08-03 NOTE — PROGRESS NOTES
"  Start Time: 11:05  End Time: 12:08    Session format: Telehealth video visit  Session location: Outpatient clinic    Focus of treatment:   Problem List Items Addressed This Visit       Recurrent major depressive disorder, in partial remission - Primary       Session Content    The specific focus and goals for this session were mood management and interpersonal relationship building.     We addressed the following therapy components during this session:     Chon states that he has had a lot of changes that have his \"head spinning\".  He has recently started to date a woman named Adrienne, and has been navigating the change in relationship status. This has moved to a sexual relationship and he expressed some initial difficulties including erectile functioning and difficulty with climax while partnered.  Explored anxiety factors that may be at play and provided some insights into factors that may assist in that part of their relationship. Processed through the way he has been navigating increasing levels of connection and vulnerability in interpersonal relationships. Chon identified some internal fears of making mistakes. He identified a quote \"people are unreliable narrators\" that was meaningful. Explored how this relates to our experiences and identified narrative processes and processes of defusion from our story that can be helpful in making change and offering self compassion for what makes something a \"mistake.\"   "

## 2025-08-15 ENCOUNTER — APPOINTMENT (OUTPATIENT)
Dept: UROLOGY | Facility: CLINIC | Age: 60
End: 2025-08-15
Payer: COMMERCIAL

## 2025-08-29 ENCOUNTER — APPOINTMENT (OUTPATIENT)
Dept: UROLOGY | Facility: CLINIC | Age: 60
End: 2025-08-29
Payer: COMMERCIAL

## 2025-09-12 ENCOUNTER — APPOINTMENT (OUTPATIENT)
Dept: UROLOGY | Facility: CLINIC | Age: 60
End: 2025-09-12
Payer: COMMERCIAL

## 2025-09-26 ENCOUNTER — APPOINTMENT (OUTPATIENT)
Dept: UROLOGY | Facility: CLINIC | Age: 60
End: 2025-09-26
Payer: COMMERCIAL

## 2025-10-10 ENCOUNTER — APPOINTMENT (OUTPATIENT)
Dept: UROLOGY | Facility: CLINIC | Age: 60
End: 2025-10-10
Payer: COMMERCIAL

## 2025-10-24 ENCOUNTER — APPOINTMENT (OUTPATIENT)
Dept: UROLOGY | Facility: CLINIC | Age: 60
End: 2025-10-24
Payer: COMMERCIAL

## 2025-11-07 ENCOUNTER — APPOINTMENT (OUTPATIENT)
Dept: UROLOGY | Facility: CLINIC | Age: 60
End: 2025-11-07
Payer: COMMERCIAL

## 2025-11-21 ENCOUNTER — APPOINTMENT (OUTPATIENT)
Dept: UROLOGY | Facility: CLINIC | Age: 60
End: 2025-11-21
Payer: COMMERCIAL

## 2025-12-05 ENCOUNTER — APPOINTMENT (OUTPATIENT)
Dept: UROLOGY | Facility: CLINIC | Age: 60
End: 2025-12-05
Payer: COMMERCIAL

## 2025-12-19 ENCOUNTER — APPOINTMENT (OUTPATIENT)
Dept: UROLOGY | Facility: CLINIC | Age: 60
End: 2025-12-19
Payer: COMMERCIAL

## 2026-01-02 ENCOUNTER — APPOINTMENT (OUTPATIENT)
Dept: UROLOGY | Facility: CLINIC | Age: 61
End: 2026-01-02
Payer: COMMERCIAL